# Patient Record
Sex: FEMALE | Race: ASIAN | NOT HISPANIC OR LATINO | URBAN - METROPOLITAN AREA
[De-identification: names, ages, dates, MRNs, and addresses within clinical notes are randomized per-mention and may not be internally consistent; named-entity substitution may affect disease eponyms.]

---

## 2018-12-31 ENCOUNTER — INPATIENT (INPATIENT)
Facility: HOSPITAL | Age: 68
LOS: 6 days | Discharge: HOME | DRG: 494 | End: 2019-01-07
Attending: ORTHOPAEDIC SURGERY | Admitting: ORTHOPAEDIC SURGERY
Payer: MEDICARE

## 2018-12-31 VITALS
TEMPERATURE: 98 F | DIASTOLIC BLOOD PRESSURE: 89 MMHG | RESPIRATION RATE: 16 BRPM | SYSTOLIC BLOOD PRESSURE: 173 MMHG | HEIGHT: 62 IN | OXYGEN SATURATION: 98 % | WEIGHT: 134.92 LBS | HEART RATE: 16 BPM

## 2018-12-31 DIAGNOSIS — S82.841A DISPLACED BIMALLEOLAR FRACTURE OF RIGHT LOWER LEG, INITIAL ENCOUNTER FOR CLOSED FRACTURE: ICD-10-CM

## 2018-12-31 LAB
ALBUMIN SERPL ELPH-MCNC: 3.7 G/DL — SIGNIFICANT CHANGE UP (ref 3.5–5)
ALP SERPL-CCNC: 70 U/L — SIGNIFICANT CHANGE UP (ref 40–120)
ALT FLD-CCNC: 45 U/L DA — SIGNIFICANT CHANGE UP (ref 10–60)
ANION GAP SERPL CALC-SCNC: 9 MMOL/L — SIGNIFICANT CHANGE UP (ref 5–17)
AST SERPL-CCNC: 15 U/L — SIGNIFICANT CHANGE UP (ref 10–40)
BASOPHILS # BLD AUTO: 0.1 K/UL — SIGNIFICANT CHANGE UP (ref 0–0.2)
BASOPHILS NFR BLD AUTO: 0.9 % — SIGNIFICANT CHANGE UP (ref 0–2)
BILIRUB SERPL-MCNC: 0.4 MG/DL — SIGNIFICANT CHANGE UP (ref 0.2–1.2)
BUN SERPL-MCNC: 15 MG/DL — SIGNIFICANT CHANGE UP (ref 7–18)
CALCIUM SERPL-MCNC: 8.7 MG/DL — SIGNIFICANT CHANGE UP (ref 8.4–10.5)
CHLORIDE SERPL-SCNC: 105 MMOL/L — SIGNIFICANT CHANGE UP (ref 96–108)
CO2 SERPL-SCNC: 28 MMOL/L — SIGNIFICANT CHANGE UP (ref 22–31)
CREAT SERPL-MCNC: 0.75 MG/DL — SIGNIFICANT CHANGE UP (ref 0.5–1.3)
EOSINOPHIL # BLD AUTO: 0.2 K/UL — SIGNIFICANT CHANGE UP (ref 0–0.5)
EOSINOPHIL NFR BLD AUTO: 1.8 % — SIGNIFICANT CHANGE UP (ref 0–6)
GLUCOSE SERPL-MCNC: 247 MG/DL — HIGH (ref 70–99)
HCT VFR BLD CALC: 39.4 % — SIGNIFICANT CHANGE UP (ref 34.5–45)
HGB BLD-MCNC: 12.5 G/DL — SIGNIFICANT CHANGE UP (ref 11.5–15.5)
INR BLD: 1.05 RATIO — SIGNIFICANT CHANGE UP (ref 0.88–1.16)
LYMPHOCYTES # BLD AUTO: 1.5 K/UL — SIGNIFICANT CHANGE UP (ref 1–3.3)
LYMPHOCYTES # BLD AUTO: 14.5 % — SIGNIFICANT CHANGE UP (ref 13–44)
MCHC RBC-ENTMCNC: 27.8 PG — SIGNIFICANT CHANGE UP (ref 27–34)
MCHC RBC-ENTMCNC: 31.7 GM/DL — LOW (ref 32–36)
MCV RBC AUTO: 87.5 FL — SIGNIFICANT CHANGE UP (ref 80–100)
MONOCYTES # BLD AUTO: 0.7 K/UL — SIGNIFICANT CHANGE UP (ref 0–0.9)
MONOCYTES NFR BLD AUTO: 6.9 % — SIGNIFICANT CHANGE UP (ref 2–14)
NEUTROPHILS # BLD AUTO: 8 K/UL — HIGH (ref 1.8–7.4)
NEUTROPHILS NFR BLD AUTO: 76 % — SIGNIFICANT CHANGE UP (ref 43–77)
PLATELET # BLD AUTO: 201 K/UL — SIGNIFICANT CHANGE UP (ref 150–400)
POTASSIUM SERPL-MCNC: 3.7 MMOL/L — SIGNIFICANT CHANGE UP (ref 3.5–5.3)
POTASSIUM SERPL-SCNC: 3.7 MMOL/L — SIGNIFICANT CHANGE UP (ref 3.5–5.3)
PROT SERPL-MCNC: 7.2 G/DL — SIGNIFICANT CHANGE UP (ref 6–8.3)
PROTHROM AB SERPL-ACNC: 11.7 SEC — SIGNIFICANT CHANGE UP (ref 10–12.9)
RBC # BLD: 4.51 M/UL — SIGNIFICANT CHANGE UP (ref 3.8–5.2)
RBC # FLD: 13.5 % — SIGNIFICANT CHANGE UP (ref 10.3–14.5)
SODIUM SERPL-SCNC: 142 MMOL/L — SIGNIFICANT CHANGE UP (ref 135–145)
WBC # BLD: 10.6 K/UL — HIGH (ref 3.8–10.5)
WBC # FLD AUTO: 10.6 K/UL — HIGH (ref 3.8–10.5)

## 2018-12-31 PROCEDURE — 71045 X-RAY EXAM CHEST 1 VIEW: CPT | Mod: 26

## 2018-12-31 PROCEDURE — 99285 EMERGENCY DEPT VISIT HI MDM: CPT

## 2018-12-31 PROCEDURE — 73610 X-RAY EXAM OF ANKLE: CPT | Mod: 26,50

## 2018-12-31 PROCEDURE — 71045 X-RAY EXAM CHEST 1 VIEW: CPT | Mod: 26,77

## 2018-12-31 PROCEDURE — 73600 X-RAY EXAM OF ANKLE: CPT | Mod: 26,50,59

## 2018-12-31 PROCEDURE — 73630 X-RAY EXAM OF FOOT: CPT | Mod: 26,50

## 2018-12-31 RX ORDER — OXYCODONE AND ACETAMINOPHEN 5; 325 MG/1; MG/1
1 TABLET ORAL EVERY 4 HOURS
Qty: 0 | Refills: 0 | Status: DISCONTINUED | OUTPATIENT
Start: 2018-12-31 | End: 2019-01-01

## 2018-12-31 RX ORDER — SODIUM CHLORIDE 9 MG/ML
1000 INJECTION, SOLUTION INTRAVENOUS
Qty: 0 | Refills: 0 | Status: DISCONTINUED | OUTPATIENT
Start: 2018-12-31 | End: 2019-01-01

## 2018-12-31 RX ORDER — CEFAZOLIN SODIUM 1 G
2000 VIAL (EA) INJECTION ONCE
Qty: 0 | Refills: 0 | Status: COMPLETED | OUTPATIENT
Start: 2018-12-31 | End: 2018-12-31

## 2018-12-31 RX ORDER — AMLODIPINE BESYLATE 2.5 MG/1
5 TABLET ORAL DAILY
Qty: 0 | Refills: 0 | Status: DISCONTINUED | OUTPATIENT
Start: 2018-12-31 | End: 2019-01-01

## 2018-12-31 RX ORDER — SODIUM CHLORIDE 9 MG/ML
3 INJECTION INTRAMUSCULAR; INTRAVENOUS; SUBCUTANEOUS ONCE
Qty: 0 | Refills: 0 | Status: COMPLETED | OUTPATIENT
Start: 2018-12-31 | End: 2018-12-31

## 2018-12-31 RX ORDER — HEPARIN SODIUM 5000 [USP'U]/ML
5000 INJECTION INTRAVENOUS; SUBCUTANEOUS EVERY 8 HOURS
Qty: 0 | Refills: 0 | Status: DISCONTINUED | OUTPATIENT
Start: 2018-12-31 | End: 2019-01-01

## 2018-12-31 RX ORDER — OXYCODONE AND ACETAMINOPHEN 5; 325 MG/1; MG/1
2 TABLET ORAL EVERY 6 HOURS
Qty: 0 | Refills: 0 | Status: DISCONTINUED | OUTPATIENT
Start: 2018-12-31 | End: 2019-01-01

## 2018-12-31 RX ADMIN — SODIUM CHLORIDE 3 MILLILITER(S): 9 INJECTION INTRAMUSCULAR; INTRAVENOUS; SUBCUTANEOUS at 11:18

## 2018-12-31 RX ADMIN — HEPARIN SODIUM 5000 UNIT(S): 5000 INJECTION INTRAVENOUS; SUBCUTANEOUS at 21:24

## 2018-12-31 RX ADMIN — Medication 100 MILLIGRAM(S): at 11:51

## 2018-12-31 NOTE — ED ADULT NURSE NOTE - NSIMPLEMENTINTERV_GEN_ALL_ED
Implemented All Universal Safety Interventions:  [place] to call system. Call bell, personal items and telephone within reach. Instruct patient to call for assistance. Room bathroom lighting operational. Non-slip footwear when patient is off stretcher. Physically safe environment: no spills, clutter or unnecessary equipment. Stretcher in lowest position, wheels locked, appropriate side rails in place.

## 2018-12-31 NOTE — CONSULT NOTE ADULT - SUBJECTIVE AND OBJECTIVE BOX
JAIMIE RODRÍGUEZ  MR# [id number]  68yFemale        Patient is a 68y old  Female who presents with a chief complaint of bilateral ankle fractures (31 Dec 2018 18:09)      INTERVAL HPI/OVERNIGHT EVENTS:  Patient seen and examined at bedside. No notations of chest pain, palpitation, SOB, orthopnea, nausea, vomiting or abdominal pain.  	  69 Y/O F with PMHx of HTN presents to the ED w/ bimalleolar fx of left ankle and trimalleolar fx of the right ankle s/p fall today. Pt. states she was walking down the stairs and missed one step. The pt tried stopping herself by leaning forward with her toes but ended up land face forward. Pt. rates her pain 7/10 and mentions the right ankle pain is worse compared to the left.  Pt denies head trauma, recent falls prior, LOC, Chest pain, SOB, dyspnea, paresthesias, N/V/D, abdominal pain, syncope, or pain anywhere else.      Review of Systems:  Other Review of Systems: All other review of systems negative, except as noted in HPI	      Allergies and Intolerances:        Allergies:  	No Known Allergies:     Home Medications:   * Patient Currently Takes Medications as of 21-Nov-2015 22:26 documented in Structured Notes  · 	amLODIPine 5 mg oral tablet: 1 tab(s) orally once a day    .    Patient History:    Past Medical History:  Hypertension.     Past Surgical History:  No significant past surgical history.     Tobacco Screening:  · Core Measure Site	Yes	  · Has the patient used tobacco in the past 30 days?	No	    Risk Assessment:    Present on Admission:  Deep Venous Thrombosis	no	  Pulmonary Embolus	no	     Heart Failure:  Does this patient have a history of or has been diagnosed with heart failure? no.    Hepatitis C Screen (per [facility] Department of Health, hepatitis C screening must be offered to every individual born between 1945 and 1965)	Offered and patient declined	        MEDICATIONS  amLODIPine   Tablet 5 milliGRAM(s) Oral daily  dextrose 5% + sodium chloride 0.9%. 1000 milliLiter(s) IV Continuous <Continuous>  heparin  Injectable 5000 Unit(s) SubCutaneous every 8 hours  oxyCODONE    5 mG/acetaminophen 325 mG 1 Tablet(s) Oral every 4 hours PRN Mild Pain (1 - 3)  oxyCODONE    5 mG/acetaminophen 325 mG 2 Tablet(s) Oral every 6 hours PRN Moderate Pain (4 - 6)              REVIEW OF SYSTEMS:  CONSTITUTIONAL: No fever, weight loss, or fatigue  EYES: No eye pain, visual disturbances, or discharge  ENT:  No difficulty hearing, tinnitus, vertigo; No sinus or throat pain  NECK: No pain or stiffness  RESPIRATORY: No cough, wheezing, chills or hemoptysis; No Shortness of Breath  CARDIOVASCULAR: No chest pain, palpitations, passing out, dizziness, or leg swelling  GASTROINTESTINAL: No abdominal or epigastric pain. No nausea, vomiting, or hematemesis; No diarrhea or constipation. No melena or hematochezia.  GENITOURINARY: No dysuria, frequency, hematuria, or incontinence  NEUROLOGICAL: No headaches, memory loss, loss of strength, numbness, or tremors  SKIN: No itching, burning, rashes, or lesions   LYMPH Nodes: No enlarged glands  ENDOCRINE: No heat or cold intolerance; No hair loss  MUSCULOSKELETAL: No joint pain or swelling; No muscle, back, or extremity pain  PSYCHIATRIC: No depression, anxiety, mood swings, or difficulty sleeping  HEME/LYMPH: No easy bruising, or bleeding gums  ALLERGY AND IMMUNOLOGIC: No hives or eczema	    [ ] All others negative	  [ ] Unable to obtain      T(C): 37.2 (12-31-18 @ 18:25), Max: 37.2 (12-31-18 @ 18:25)  T(F): 99 (12-31-18 @ 18:25), Max: 99 (12-31-18 @ 18:25)  HR: 89 (12-31-18 @ 18:25) (16 - 89)  BP: 119/65 (12-31-18 @ 18:25) (119/65 - 173/89)  RR: 17 (12-31-18 @ 18:25) (16 - 17)  SpO2: 98% (12-31-18 @ 18:25) (96% - 98%)  Wt(kg): --  Height (cm): 157.48 (12-31 @ 09:49)  Weight (kg): 61.2 (12-31 @ 09:49)  BMI (kg/m2): 24.7 (12-31 @ 09:49)  BSA (m2): 1.62 (12-31 @ 09:49)  I&O's Summary        PHYSICAL EXAM:  A X O x  HEAD:  Atraumatic, Normocephalic  EYES: EOMI, PERRLA, conjunctiva and sclera clear  NECK: Supple, No JVD, Normal thyroid  Resp: CTAB, No crackles, wheezing,   CVS: Regular rate and rhythm; No discernable murmurs, rubs, or gallops  ABD: Soft, Nontender, Nondistended; Bowel sounds present  EXTREMITIES:  2+ Peripheral Pulses, No edema  LYMPH: No dicernable lymphadenopathy noted  GENERAL: NAD, well-groomed, well-developed      LABS:                        12.5   10.6  )-----------( 201      ( 31 Dec 2018 11:35 )             39.4     12-31    142  |  105  |  15  ----------------------------<  247<H>  3.7   |  28  |  0.75    Ca    8.7      31 Dec 2018 11:35    TPro  7.2  /  Alb  3.7  /  TBili  0.4  /  DBili  x   /  AST  15  /  ALT  45  /  AlkPhos  70  12-31        CAPILLARY BLOOD GLUCOSE          Troponins:  ProBNP:  Lipid Profile:   HgA1c:  TSH:           RADIOLOGY & ADDITIONAL TESTS:    Imaging Personally Reviewed:  [ ] YES  [ ] NO      Consultant(s) Notes Reviewed:  [x ] YES  [ ] NO    Care Discussed with Consultants/Other Providers [ x] YES  [ ] NO          PAST MEDICAL & SURGICAL HISTORY:  Hypertension  No significant past surgical history        Closed displaced bimalleolar fracture of right ankle  Handoff  MEWS Score  Hypertension  Asthma  Ankle fracture, bimalleolar, closed, right, initial encounter  No significant past surgical history  W/BILATERAL ANKLE PAIN  90+  Ankle fracture, left

## 2018-12-31 NOTE — ED PROVIDER NOTE - MUSCULOSKELETAL MINIMAL EXAM
swelling and deformity to the right ankle, bilateral distal malleolar tenderness, left medial malleolar tenderness

## 2018-12-31 NOTE — CONSULT NOTE ADULT - ASSESSMENT
69 Y/O F with PMHx of HTN presents to the ED w/ bimalleolar fx of left ankle and trimalleolar fx of the right ankle s/p fall today. Pt. states she was walking down the stairs and missed one step. The pt tried stopping herself by leaning forward with her toes but ended up land face forward. Pt. rates her pain 7/10 and mentions the right ankle pain is worse compared to the left.  Pt denies head trauma, recent falls prior, LOC, Chest pain, SOB, dyspnea, paresthesias, N/V/D, abdominal pain, syncope, or pain anywhere else.     Given comprehensive review of H&P, labs & EKG and CXR pending WNL,  in addition to denial of any risk factors in existence to suggest premature CAD or valvulopathy (reporting no exertional sob or angina, or family h/o CAD) pt has been cleared at moderate risk for OR in a.m.    -f/u Vit D, phos, mg, Ca+ total & PTH (r/o premature osteoporosis)  -continue baseline meds  -f/u EKG and CXR  -GI /DVT PP  -Cleared at moderate risk for OR

## 2018-12-31 NOTE — ED PROVIDER NOTE - OBJECTIVE STATEMENT
69 y/o female with PMHx of HTN presents to the ED with c/o bilateral ankle pain s/p falling down one step. Pt suffered a laceration to her left ankle and was unable to ambulate afterwards. Pt noted deformity on right ankle which she helped reduced on her own. Pt reports pain with movement. Pt denies numbness, head trauma, or Hx of CAD.

## 2018-12-31 NOTE — H&P ADULT - ASSESSMENT
67 y/o F with Right trimalleolar fx and left bimalleolar equivocal fracture   - Pain Management  - DVT ppx with Heparin  - Admit to Ortho Dr. Varela  - Condition: Stable  - Transfer to Floor  - Pre-op for Surgery tomorrow on 01/01/19  - Procedure: ORIF of b/l ankle fractures  - Surgeon: Dr. Varela  - Clearance: Pending - Dr. Patton made aware  - Consent: to be obtained by Dr. Varela  - Medical Clearance called  - Keep NPO past midnight tonight  - IVF when NPO  - Hold Anticoagulants tomorrow on 01/01/19  - Keep affected extremities elevated on 2 pillows  - Patient was placed in b/l Posterior - U splint   - Case Discussed with DR. Varela 69 y/o F with Right trimalleolar fx and left bimalleolar equivocal fracture   - Pain Management  - DVT ppx with Heparin  - Admit to Ortho Dr. Varela  - Condition: Stable  - Transfer to Floor  - Pre-op for Surgery tomorrow on 01/01/19  - Procedure: ORIF of b/l ankle fractures  - Surgeon: Dr. Varela  - Clearance: Pending - Dr. Patton made aware  - Consent: to be obtained by Dr. Varela  - Medical Clearance called  - Keep NPO past midnight tonight  - IVF when NPO  - Hold Anticoagulants tomorrow on 01/01/19  - Keep affected extremities elevated on 2 pillows  - Patient was placed in b/l Posterior - U splint   - Left ankle wound was copiously irrigated with betadine and NaCl. Xeroform placed over wound.  - Case Discussed with DR. Varela

## 2018-12-31 NOTE — ED PROVIDER NOTE - CARE PLAN
Principal Discharge DX:	Ankle fracture, bimalleolar, closed, right, initial encounter  Secondary Diagnosis:	Ankle fracture, left

## 2018-12-31 NOTE — H&P ADULT - NSHPLABSRESULTS_GEN_ALL_CORE
12.5   10.6  )-----------( 201      ( 31 Dec 2018 11:35 )             39.4   12-31    142  |  105  |  15  ----------------------------<  247<H>  3.7   |  28  |  0.75    Ca    8.7      31 Dec 2018 11:35    TPro  7.2  /  Alb  3.7  /  TBili  0.4  /  DBili  x   /  AST  15  /  ALT  45  /  AlkPhos  70  12-31    < from: Xray Ankle Complete 3 Views, Bilateral (12.31.18 @ 13:00) >      EXAM:  FOOT BILATERAL (MINIMUM 3 V)                          EXAM:  ANKLE BILATERAL (MINIMUM 3 V)                        INTERPRETATION:  History: Fall. Bilateral ankle pain.    Findings: Frontal, lateral and oblique projections bilateral ankle and   frontal, lateral and oblique projections bilateral feet performed.   Right ankle:   Displaced trimalleolar fracture and dislocation right ankle.     Soft tissue swelling.   Plantar calcaneal spur.  Left ankle:    Nondisplaced bimalleolar fracture left ankle. Left ankle mortise is   aligned. There is also nondisplaced fracture proximal left fifth   metatarsal.     Diffuse soft tissue swelling. Few dots of air seen within the   subcutaneous soft tissues.    Small plantar calcaneal spur.    Impression:  Displaced trimalleolar fracture and dislocation right ankle.  Nondisplaced bimalleolar fracture left ankle.    Nondisplaced fracture proximal left fifth metatarsal.

## 2018-12-31 NOTE — H&P ADULT - NSHPPHYSICALEXAM_GEN_ALL_CORE
General: AAOx3. Nad  HEENT: PERRLA, EOMI  Neck: No JVD, neck supple, Trachea midline  CVS: S1/S1 present, RRR, no MRG  Pulm: CTA b/l. No WRR  Abd: +BS, soft, ND NT  Ext: 2+ pulses. No edema. good cap refill SILT  Neuro: no focal neurodeficits. CNII-XII intact  MSK:   LLE: Skin warm and pink. Swelling noted to medial/lateral aspect of ankle. Small punctate wound noted over anterior aspect of ankle. Left ankle AROM limited 2/2 pain. Full AROM of all toes. Cap refill <2 seconds. Compartments soft and compressible. Calves soft and NTTP. SILT. NVI  RLE: Skin warm and pink. Diffuse swelling noted over medial/lateral aspect of ankle. Ecchymosis noted over ankle. Tenderness to palpation over medial/lateral malleolus. No open wound. NVI. SILT. compartments soft and compressible. Calves soft and NTTP.

## 2018-12-31 NOTE — H&P ADULT - ATTENDING COMMENTS
pt abel and evaluated.  bilateral ankle fractures.  rec cr l ankle fx bimall, treatw iht closed reduction / fracture treatment / care.  cr persormed on r andkl rec orif.  small punctate wound on r ankle rec exploration poss I and d cont antibiotics.  nwb / dvt prophylaxis.  will need mecial clearance

## 2018-12-31 NOTE — H&P ADULT - HISTORY OF PRESENT ILLNESS
67 Y/O F with PMHx of HTN presents to the ED w/ bimalleolar fx of left ankle and trimalleolar fx of the right ankle s/p fall today. Pt. states she was walking down the stairs and missed one step. The pt. tried stopping herself by leaning forward with her toes but ended up land face forward. Pt. rates her pain 7/10 and mentions the right ankle pain is worse compared to the left.  Pt denies head trauma, recent falls prior, LOC, Chest pain, SOB, dyspnea, paresthesias, N/V/D, abdominal pain, syncope, or pain anywhere else. 67 Y/O F with PMHx of HTN presents to the ED w/ bilateral ankle fractures s/p fall today. Pt. states she was walking down the stairs and missed one step. The pt. tried stopping herself by leaning forward with her toes but ended up landing face forward. Pt. rates her pain "7/10", non-radiating and mentions the right ankle pain is worse compared to the left.  Pt denies head trauma, recent falls prior, LOC, Chest pain, SOB, dyspnea, paresthesias, N/V/D, abdominal pain, syncope, or pain anywhere else.

## 2019-01-01 PROCEDURE — 77071 MNL APPL STRS JT RADIOGRAPHY: CPT

## 2019-01-01 PROCEDURE — 20900 REMOVAL OF BONE FOR GRAFT: CPT | Mod: AS,RT,59

## 2019-01-01 PROCEDURE — 27658 REPAIR OF LEG TENDON EACH: CPT | Mod: AS,59,RT

## 2019-01-01 PROCEDURE — 27814 TREATMENT OF ANKLE FRACTURE: CPT | Mod: AS,RT

## 2019-01-01 PROCEDURE — 27656 REPAIR LEG FASCIA DEFECT: CPT | Mod: AS,59,RT

## 2019-01-01 RX ORDER — HYDROMORPHONE HYDROCHLORIDE 2 MG/ML
0.5 INJECTION INTRAMUSCULAR; INTRAVENOUS; SUBCUTANEOUS
Qty: 0 | Refills: 0 | Status: DISCONTINUED | OUTPATIENT
Start: 2019-01-01 | End: 2019-01-01

## 2019-01-01 RX ORDER — ENOXAPARIN SODIUM 100 MG/ML
30 INJECTION SUBCUTANEOUS EVERY 12 HOURS
Qty: 0 | Refills: 0 | Status: DISCONTINUED | OUTPATIENT
Start: 2019-01-01 | End: 2019-01-07

## 2019-01-01 RX ORDER — SODIUM CHLORIDE 9 MG/ML
1000 INJECTION, SOLUTION INTRAVENOUS
Qty: 0 | Refills: 0 | Status: DISCONTINUED | OUTPATIENT
Start: 2019-01-01 | End: 2019-01-01

## 2019-01-01 RX ORDER — AMLODIPINE BESYLATE 2.5 MG/1
5 TABLET ORAL DAILY
Qty: 0 | Refills: 0 | Status: DISCONTINUED | OUTPATIENT
Start: 2019-01-01 | End: 2019-01-07

## 2019-01-01 RX ORDER — SODIUM CHLORIDE 9 MG/ML
1000 INJECTION, SOLUTION INTRAVENOUS
Qty: 0 | Refills: 0 | Status: DISCONTINUED | OUTPATIENT
Start: 2019-01-02 | End: 2019-01-07

## 2019-01-01 RX ORDER — OXYCODONE HYDROCHLORIDE 5 MG/1
5 TABLET ORAL EVERY 4 HOURS
Qty: 0 | Refills: 0 | Status: DISCONTINUED | OUTPATIENT
Start: 2019-01-01 | End: 2019-01-07

## 2019-01-01 RX ORDER — FENTANYL CITRATE 50 UG/ML
25 INJECTION INTRAVENOUS
Qty: 0 | Refills: 0 | Status: DISCONTINUED | OUTPATIENT
Start: 2019-01-01 | End: 2019-01-01

## 2019-01-01 RX ORDER — ACETAMINOPHEN 500 MG
650 TABLET ORAL EVERY 6 HOURS
Qty: 0 | Refills: 0 | Status: DISCONTINUED | OUTPATIENT
Start: 2019-01-01 | End: 2019-01-07

## 2019-01-01 RX ORDER — OXYCODONE HYDROCHLORIDE 5 MG/1
10 TABLET ORAL EVERY 4 HOURS
Qty: 0 | Refills: 0 | Status: DISCONTINUED | OUTPATIENT
Start: 2019-01-01 | End: 2019-01-07

## 2019-01-01 RX ORDER — MORPHINE SULFATE 50 MG/1
2 CAPSULE, EXTENDED RELEASE ORAL EVERY 4 HOURS
Qty: 0 | Refills: 0 | Status: DISCONTINUED | OUTPATIENT
Start: 2019-01-02 | End: 2019-01-07

## 2019-01-01 RX ORDER — CEFAZOLIN SODIUM 1 G
1000 VIAL (EA) INJECTION EVERY 8 HOURS
Qty: 0 | Refills: 0 | Status: DISCONTINUED | OUTPATIENT
Start: 2019-01-01 | End: 2019-01-04

## 2019-01-01 RX ORDER — ONDANSETRON 8 MG/1
4 TABLET, FILM COATED ORAL ONCE
Qty: 0 | Refills: 0 | Status: DISCONTINUED | OUTPATIENT
Start: 2019-01-01 | End: 2019-01-01

## 2019-01-01 RX ADMIN — Medication 100 MILLIGRAM(S): at 22:24

## 2019-01-01 RX ADMIN — SODIUM CHLORIDE 75 MILLILITER(S): 9 INJECTION, SOLUTION INTRAVENOUS at 11:44

## 2019-01-01 RX ADMIN — SODIUM CHLORIDE 125 MILLILITER(S): 9 INJECTION, SOLUTION INTRAVENOUS at 18:54

## 2019-01-01 RX ADMIN — AMLODIPINE BESYLATE 5 MILLIGRAM(S): 2.5 TABLET ORAL at 06:32

## 2019-01-01 RX ADMIN — OXYCODONE HYDROCHLORIDE 10 MILLIGRAM(S): 5 TABLET ORAL at 22:25

## 2019-01-01 RX ADMIN — OXYCODONE HYDROCHLORIDE 10 MILLIGRAM(S): 5 TABLET ORAL at 23:55

## 2019-01-01 NOTE — PROGRESS NOTE ADULT - SUBJECTIVE AND OBJECTIVE BOX
JAIMIE RODRÍGUEZ  MR# [id number]  68yFemale        Patient is a 68y old  Female who presents with a chief complaint of bilateral ankle fractures (31 Dec 2018 19:35)      INTERVAL HPI/OVERNIGHT EVENTS:  Patient seen and examined at bedside. No notations of chest pain, palpitation, SOB, orthopnea, nausea, vomiting or abdominal pain.    ALLERGIES  No Known Allergies      MEDICATIONS  amLODIPine   Tablet 5 milliGRAM(s) Oral daily  dextrose 5% + sodium chloride 0.9%. 1000 milliLiter(s) IV Continuous <Continuous>  heparin  Injectable 5000 Unit(s) SubCutaneous every 8 hours  oxyCODONE    5 mG/acetaminophen 325 mG 1 Tablet(s) Oral every 4 hours PRN Mild Pain (1 - 3)  oxyCODONE    5 mG/acetaminophen 325 mG 2 Tablet(s) Oral every 6 hours PRN Moderate Pain (4 - 6)              REVIEW OF SYSTEMS:  CONSTITUTIONAL: No fever, weight loss, or fatigue  EYES: No eye pain, visual disturbances, or discharge  ENT:  No difficulty hearing, tinnitus, vertigo; No sinus or throat pain  NECK: No pain or stiffness  RESPIRATORY: No cough, wheezing, chills or hemoptysis; No Shortness of Breath  CARDIOVASCULAR: No chest pain, palpitations, passing out, dizziness, or leg swelling  GASTROINTESTINAL: No abdominal or epigastric pain. No nausea, vomiting, or hematemesis; No diarrhea or constipation. No melena or hematochezia.  GENITOURINARY: No dysuria, frequency, hematuria, or incontinence  NEUROLOGICAL: No headaches, memory loss, loss of strength, numbness, or tremors  SKIN: No itching, burning, rashes, or lesions   LYMPH Nodes: No enlarged glands  ENDOCRINE: No heat or cold intolerance; No hair loss  MUSCULOSKELETAL: No joint pain or swelling; No muscle, back, or extremity pain  PSYCHIATRIC: No depression, anxiety, mood swings, or difficulty sleeping  HEME/LYMPH: No easy bruising, or bleeding gums  ALLERGY AND IMMUNOLOGIC: No hives or eczema	    [ ] All others negative	  [ ] Unable to obtain      T(C): 37.3 (01-01-19 @ 05:55), Max: 37.6 (12-31-18 @ 22:03)  T(F): 99.2 (01-01-19 @ 05:55), Max: 99.7 (12-31-18 @ 22:03)  HR: 94 (01-01-19 @ 05:55) (16 - 94)  BP: 136/67 (01-01-19 @ 05:55) (119/65 - 173/89)  RR: 16 (01-01-19 @ 05:55) (16 - 17)  SpO2: 96% (01-01-19 @ 05:55) (95% - 98%)  Wt(kg): --  Height (cm): 157.48 (12-31 @ 09:49)  Weight (kg): 61.2 (12-31 @ 09:49)  BMI (kg/m2): 24.7 (12-31 @ 09:49)  BSA (m2): 1.62 (12-31 @ 09:49)  I&O's Summary        PHYSICAL EXAM:  A X O x  HEAD:  Atraumatic, Normocephalic  EYES: EOMI, PERRLA, conjunctiva and sclera clear  NECK: Supple, No JVD, Normal thyroid  Resp: CTAB, No crackles, wheezing,   CVS: Regular rate and rhythm; No discernable murmurs, rubs, or gallops  ABD: Soft, Nontender, Nondistended; Bowel sounds present  EXTREMITIES:  2+ Peripheral Pulses, No edema  LYMPH: No dicernable lymphadenopathy noted  GENERAL: NAD, well-groomed, well-developed      LABS:                        12.5   10.6  )-----------( 201      ( 31 Dec 2018 11:35 )             39.4     12-31    142  |  105  |  15  ----------------------------<  247<H>  3.7   |  28  |  0.75    Ca    8.7      31 Dec 2018 11:35    TPro  7.2  /  Alb  3.7  /  TBili  0.4  /  DBili  x   /  AST  15  /  ALT  45  /  AlkPhos  70  12-31    PT/INR - ( 31 Dec 2018 19:34 )   PT: 11.7 sec;   INR: 1.05 ratio             CAPILLARY BLOOD GLUCOSE          Troponins:  ProBNP:  Lipid Profile:   HgA1c:  TSH:           RADIOLOGY & ADDITIONAL TESTS:    Imaging Personally Reviewed:  [ ] YES  [ ] NO      Consultant(s) Notes Reviewed:  [x ] YES  [ ] NO    Care Discussed with Consultants/Other Providers [ x] YES  [ ] NO          PAST MEDICAL & SURGICAL HISTORY:  Hypertension  No significant past surgical history        Closed displaced bimalleolar fracture of right ankle  Handoff  MEWS Score  Hypertension  Asthma  Ankle fracture, bimalleolar, closed, right, initial encounter  No significant past surgical history  W/BILATERAL ANKLE PAIN  90+  Ankle fracture, left

## 2019-01-01 NOTE — PROGRESS NOTE ADULT - ATTENDING COMMENTS
pt seen and evaluated.  rec ORIF r ankle trimalleolar fracture.  eua ankle wound and fracture stability.  explained risks benefits alternatives to pt

## 2019-01-01 NOTE — PROGRESS NOTE ADULT - SUBJECTIVE AND OBJECTIVE BOX
JAIMIE RODRÍGUEZ  [id number]  68y    Orthopedic Pre-Op Note    Dx: B/L ankle fractures.     Pt tolerated procedure well.  No acute events. no acute complaints.     Denies cp/sob/dyspnea,paresthesias.    T(C): 37.3 (01-01-19 @ 07:59), Max: 37.6 (12-31-18 @ 22:03)  HR: 94 (01-01-19 @ 07:59) (16 - 94)  BP: 136/64 (01-01-19 @ 07:59) (119/65 - 149/83)  RR: 16 (01-01-19 @ 05:55) (16 - 17)  SpO2: 96% (01-01-19 @ 07:59) (95% - 98%)    PE:   Lt ankle: small 1mm punctate wound over anterior ankle that drains blood with pressure.  tenderness over medial and lateral malleoli. 2+pulses. nvi silt. no ct calf soft b/l. AO Splint intact.   Rt ankle: no open wounds. skin pink and intact. warm. no fracture blisters noted. AO splint intact.   tenderness over malleoli. 2+ pulses. nvi silt. no ct calves    Impression:   Plan:  - Pain control  - Condition: Stable  - medically cleared  - dvt ppx  - for surgery today  - procedure: ORIF rt ankle fx, I&D lt ankle possibly  - surgeon: dr kinney  - rey d/w dr kinney

## 2019-01-01 NOTE — PROGRESS NOTE ADULT - ASSESSMENT
69 Y/O F with PMHx of HTN presents to the ED w/ bimalleolar fx of left ankle and trimalleolar fx of the right ankle s/p fall today. Pt. states she was walking down the stairs and missed one step. The pt tried stopping herself by leaning forward with her toes but ended up land face forward. Pt. rates her pain 7/10 and mentions the right ankle pain is worse compared to the left.  Pt denies head trauma, recent falls prior, LOC, Chest pain, SOB, dyspnea, paresthesias, N/V/D, abdominal pain, syncope, or pain anywhere else.     Given comprehensive review of H&P, labs & EKG and CXR pending WNL,  in addition to denial of any risk factors in existence to suggest premature CAD or valvulopathy (reporting no exertional sob or angina, or family h/o CAD) pt has been cleared at moderate risk for OR in a.m.    -f/u Vit D, phos, mg, Ca+ total & PTH (r/o premature osteoporosis)  -continue baseline meds  -EKG and CXR - WNL (EKG done by bedside, no discernable anomaly   -GI /DVT PP  -Cleared at moderate risk for OR / General Anesthesia

## 2019-01-02 RX ADMIN — AMLODIPINE BESYLATE 5 MILLIGRAM(S): 2.5 TABLET ORAL at 05:32

## 2019-01-02 RX ADMIN — ENOXAPARIN SODIUM 30 MILLIGRAM(S): 100 INJECTION SUBCUTANEOUS at 17:17

## 2019-01-02 RX ADMIN — OXYCODONE HYDROCHLORIDE 10 MILLIGRAM(S): 5 TABLET ORAL at 05:29

## 2019-01-02 RX ADMIN — Medication 100 MILLIGRAM(S): at 05:32

## 2019-01-02 RX ADMIN — Medication 100 MILLIGRAM(S): at 13:38

## 2019-01-02 RX ADMIN — OXYCODONE HYDROCHLORIDE 10 MILLIGRAM(S): 5 TABLET ORAL at 06:00

## 2019-01-02 RX ADMIN — ENOXAPARIN SODIUM 30 MILLIGRAM(S): 100 INJECTION SUBCUTANEOUS at 05:32

## 2019-01-02 RX ADMIN — Medication 100 MILLIGRAM(S): at 22:14

## 2019-01-02 NOTE — PROGRESS NOTE ADULT - ASSESSMENT
69 Y/O F with PMHx of HTN presents to the ED w/ bimalleolar fx of left ankle and trimalleolar fx of the right ankle s/p fall today. Pt. states she was walking down the stairs and missed one step. The pt tried stopping herself by leaning forward with her toes but ended up land face forward. Pt. rates her pain 7/10 and mentions the right ankle pain is worse compared to the left.  Pt denies head trauma, recent falls prior, LOC, Chest pain, SOB, dyspnea, paresthesias, N/V/D, abdominal pain, syncope, or pain anywhere else.     Given comprehensive review of H&P, labs & EKG and CXR pending WNL,  in addition to denial of any risk factors in existence to suggest premature CAD or valvulopathy (reporting no exertional sob or angina, or family h/o CAD) pt was cleared at moderate risk for OR - S/P left bi-malleolar fx and S/p ORIF Right Ankle Fx POD#1    -f/u Vit D, phos, mg, Ca+ total & PTH (r/o premature osteoporosis)  -continue baseline meds  -Incentive stephanie, oob to chair w/ PT/OT to follow  -Consider NSAIDs use in pain management to decrease reliance on opoid based analgesic given GI / hemodynamic side-effcts  -GI /DVT PPX

## 2019-01-02 NOTE — PROGRESS NOTE ADULT - SUBJECTIVE AND OBJECTIVE BOX
JAIMIE RODRÍGUEZ  MR# [id number]  68yFemale        Patient is a 68y old  Female who presents with a chief complaint of bilateral ankle fractures (02 Jan 2019 07:43)      INTERVAL HPI/OVERNIGHT EVENTS:  Patient seen and examined at bedside. S/P left bi-malleolar fx and S/p ORIF Right Ankle Fx POD#1. No notations of chest pain, palpitation, SOB, orthopnea, nausea, vomiting or abdominal pain.    ALLERGIES  No Known Allergies      MEDICATIONS  acetaminophen   Tablet .. 650 milliGRAM(s) Oral every 6 hours PRN Temp greater or equal to 38C (100.4F)  amLODIPine   Tablet 5 milliGRAM(s) Oral daily  ceFAZolin   IVPB 1000 milliGRAM(s) IV Intermittent every 8 hours  dextrose 5% + sodium chloride 0.9%. 1000 milliLiter(s) IV Continuous <Continuous>  enoxaparin Injectable 30 milliGRAM(s) SubCutaneous every 12 hours  morphine  - Injectable 2 milliGRAM(s) IV Push every 4 hours PRN Moderate Pain (4 - 6)  oxyCODONE    IR 10 milliGRAM(s) Oral every 4 hours PRN Moderate Pain (4 - 6)  oxyCODONE    IR 5 milliGRAM(s) Oral every 4 hours PRN Mild Pain (1 - 3)              REVIEW OF SYSTEMS:  CONSTITUTIONAL: No fever, weight loss, or fatigue  EYES: No eye pain, visual disturbances, or discharge  ENT:  No difficulty hearing, tinnitus, vertigo; No sinus or throat pain  NECK: No pain or stiffness  RESPIRATORY: No cough, wheezing, chills or hemoptysis; No Shortness of Breath  CARDIOVASCULAR: No chest pain, palpitations, passing out, dizziness, or leg swelling  GASTROINTESTINAL: No abdominal or epigastric pain. No nausea, vomiting, or hematemesis; No diarrhea or constipation. No melena or hematochezia.  GENITOURINARY: No dysuria, frequency, hematuria, or incontinence  NEUROLOGICAL: No headaches, memory loss, loss of strength, numbness, or tremors  SKIN: No itching, burning, rashes, or lesions   LYMPH Nodes: No enlarged glands  ENDOCRINE: No heat or cold intolerance; No hair loss  MUSCULOSKELETAL: No joint pain or swelling; No muscle, back, or extremity pain  PSYCHIATRIC: No depression, anxiety, mood swings, or difficulty sleeping  HEME/LYMPH: No easy bruising, or bleeding gums  ALLERGY AND IMMUNOLOGIC: No hives or eczema	    [ ] All others negative	  [ ] Unable to obtain      T(C): 37.2 (01-02-19 @ 14:53), Max: 37.2 (01-02-19 @ 14:53)  T(F): 98.9 (01-02-19 @ 14:53), Max: 98.9 (01-02-19 @ 14:53)  HR: 81 (01-02-19 @ 14:53) (69 - 85)  BP: 131/58 (01-02-19 @ 14:53) (120/48 - 131/58)  RR: 16 (01-02-19 @ 14:53) (16 - 18)  SpO2: 96% (01-02-19 @ 14:53) (95% - 96%)  Wt(kg): --    I&O's Summary    01 Jan 2019 07:01  -  02 Jan 2019 07:00  --------------------------------------------------------  IN: 1500 mL / OUT: 300 mL / NET: 1200 mL          PHYSICAL EXAM:  A X O x  HEAD:  Atraumatic, Normocephalic  EYES: EOMI, PERRLA, conjunctiva and sclera clear  NECK: Supple, No JVD, Normal thyroid  Resp: CTAB, No crackles, wheezing,   CVS: Regular rate and rhythm; No discernable murmurs, rubs, or gallops  ABD: Soft, Nontender, Nondistended; Bowel sounds present  EXTREMITIES:  2+ Peripheral Pulses, No edema  LYMPH: No dicernable lymphadenopathy noted  GENERAL: NAD, well-groomed, well-developed      LABS:              CAPILLARY BLOOD GLUCOSE          Troponins:  ProBNP:  Lipid Profile:   HgA1c:  TSH:           RADIOLOGY & ADDITIONAL TESTS:    Imaging Personally Reviewed:  [ ] YES  [ ] NO      Consultant(s) Notes Reviewed:  [x ] YES  [ ] NO    Care Discussed with Consultants/Other Providers [ x] YES  [ ] NO          PAST MEDICAL & SURGICAL HISTORY:  Hypertension  No significant past surgical history        Closed displaced bimalleolar fracture of right ankle  Handoff  MEWS Score  Hypertension  Asthma  Ankle fracture, bimalleolar, closed, right, initial encounter  No significant past surgical history  W/BILATERAL ANKLE PAIN  90+  Ankle fracture, left

## 2019-01-02 NOTE — PHYSICAL THERAPY INITIAL EVALUATION ADULT - GENERAL OBSERVATIONS, REHAB EVAL
pt aox3 abby cooperative, s/p fall B ankle fx, s/p R ankle ORIF, seated bedside with assistance LUIS F BRIONES

## 2019-01-02 NOTE — PHYSICAL THERAPY INITIAL EVALUATION ADULT - PLANNED THERAPY INTERVENTIONS, PT EVAL
with updated weight bearing status/gait training/transfer training/bed mobility training/strengthening

## 2019-01-03 ENCOUNTER — TRANSCRIPTION ENCOUNTER (OUTPATIENT)
Age: 69
End: 2019-01-03

## 2019-01-03 RX ORDER — OXYCODONE HYDROCHLORIDE 5 MG/1
1 TABLET ORAL
Qty: 0 | Refills: 0 | COMMUNITY
Start: 2019-01-03

## 2019-01-03 RX ORDER — OXYCODONE HYDROCHLORIDE 5 MG/1
1 TABLET ORAL
Qty: 0 | Refills: 0 | DISCHARGE
Start: 2019-01-03

## 2019-01-03 RX ADMIN — Medication 100 MILLIGRAM(S): at 05:25

## 2019-01-03 RX ADMIN — OXYCODONE HYDROCHLORIDE 10 MILLIGRAM(S): 5 TABLET ORAL at 00:00

## 2019-01-03 RX ADMIN — AMLODIPINE BESYLATE 5 MILLIGRAM(S): 2.5 TABLET ORAL at 05:25

## 2019-01-03 RX ADMIN — Medication 100 MILLIGRAM(S): at 13:52

## 2019-01-03 RX ADMIN — ENOXAPARIN SODIUM 30 MILLIGRAM(S): 100 INJECTION SUBCUTANEOUS at 05:26

## 2019-01-03 RX ADMIN — ENOXAPARIN SODIUM 30 MILLIGRAM(S): 100 INJECTION SUBCUTANEOUS at 18:21

## 2019-01-03 RX ADMIN — Medication 100 MILLIGRAM(S): at 22:42

## 2019-01-03 RX ADMIN — OXYCODONE HYDROCHLORIDE 10 MILLIGRAM(S): 5 TABLET ORAL at 05:26

## 2019-01-03 NOTE — DISCHARGE NOTE ADULT - ADDITIONAL INSTRUCTIONS
Pain Management- See Attached Medication Reconciliation  Weight Bearing Status: NWB to b/l LE  Equipment needs: Wheelchair  Dressing: Please keep splint Clean, Dry, and Intact.  Incision Site: REMOVE sutures on right ankle on 01/16/19  Sutures TO BE REMOVED BY NURSE  NURSE TO APPLY STERI-STRIPS TO THE WOUND AFTER SUTURE REMOVAL and REAPPLY THE SAME SPLINT TO THE RLE  DVT prophylaxis: Continue for 6 weeks. D/C LOVENOX when patient is ambulating independently  PT/Occupational Therapy are Activities of Daily Living as appropriate  Follow up with Orthopedic Surgeon in 1-2 weeks after Sutures ARE REMOVED at: [phone]

## 2019-01-03 NOTE — DISCHARGE NOTE ADULT - HOSPITAL COURSE
67 y/o F admitted for bilateral ankle fractures. Patient underwent an ORIF of right ankle and irrigation of left ankle wound.

## 2019-01-03 NOTE — PROGRESS NOTE ADULT - SUBJECTIVE AND OBJECTIVE BOX
JAIMIE RODRÍGUEZ  MRN-[id number]    Orthopedics:    Diagnosis:  Left ankle bi-malleolar fx and S/p ORIF Right Ankle Fx POD# 2    Interpretation services used: ID#[id number]. Pt seen and evaluated. No acute complaints.   As per pt, Splint feels comfortable.  Denies CP/SOB, dyspnea, paresthesias.    Vital Signs Last 24 Hrs  T(C): 37.3 (03 Jan 2019 06:31), Max: 37.3 (03 Jan 2019 06:31)  T(F): 99.2 (03 Jan 2019 06:31), Max: 99.2 (03 Jan 2019 06:31)  HR: 88 (03 Jan 2019 06:31) (69 - 88)  BP: 138/68 (03 Jan 2019 06:31) (121/51 - 144/68)  BP(mean): --  RR: 18 (03 Jan 2019 06:31) (16 - 18)  SpO2: 97% (03 Jan 2019 06:31) (96% - 98%)    Physical Exam:  Ankles- Toes are pink, warm. SILT. No pain with PROM of toes. Good cap refill.  AO  Splint intact. Calves are soft and NT.       Impression: 68yFemale Left ankle bi-malleolar fx and S/p ORIF Right Ankle Fx POD# 2  -  Pain management  -  DVT prophylaxis with [ X ] Lovenox SQ   -  Daily PT- NWB of the b/l ankles  -  C/w post-op abx  -  D/c planning to rehab: awaiting acceptance.  -  Case d/w Dr. Varela

## 2019-01-03 NOTE — DISCHARGE NOTE ADULT - CARE PLAN
Principal Discharge DX:	Ankle fracture, right, closed, initial encounter  Goal:	Improve ROM and improve pain  Assessment and plan of treatment:	- S/p ORIF of right ankle.  - Patient is to be NWB to b/l LE   - Keep splint clean and dry  - Continue with Lovenox 40mg qd for 6 weeks. Discontinue when patient is ambulating independently  - Sutures are to be removed on 01/16/19  Secondary Diagnosis:	Ankle fracture, left  Goal:	Improve ROM and improve pain  Assessment and plan of treatment:	- Patient is to be NWB to left ankle  - Keep splint clean and dry  Secondary Diagnosis:	Hypertension  Goal:	C/w care as directed by primary care doctor Principal Discharge DX:	Ankle fracture, right, closed, initial encounter  Goal:	Improve ROM and improve pain  Assessment and plan of treatment:	- S/p ORIF of right ankle.  - Patient is to be NWB to b/l LE   - Keep splint clean and dry  - Continue with Lovenox 40mg qd for 6 weeks. Discontinue when patient is ambulating independently  - Sutures are to be removed on 01/16/19 - Reapply splint after suture removal  Secondary Diagnosis:	Ankle fracture, left  Goal:	Improve ROM and improve pain  Assessment and plan of treatment:	- Patient is to be NWB to left ankle  - Keep splint clean and dry  Secondary Diagnosis:	Hypertension  Goal:	C/w care as directed by primary care doctor

## 2019-01-03 NOTE — DISCHARGE NOTE ADULT - PATIENT PORTAL LINK FT
You can access the [facility] Patient Portal, offered by [facility], by registering with the following website: [email or web address]

## 2019-01-03 NOTE — DISCHARGE NOTE ADULT - PLAN OF CARE
Improve ROM and improve pain - S/p ORIF of right ankle.  - Patient is to be NWB to b/l LE   - Keep splint clean and dry  - Continue with Lovenox 40mg qd for 6 weeks. Discontinue when patient is ambulating independently  - Sutures are to be removed on 01/16/19 - Patient is to be NWB to left ankle  - Keep splint clean and dry C/w care as directed by primary care doctor - S/p ORIF of right ankle.  - Patient is to be NWB to b/l LE   - Keep splint clean and dry  - Continue with Lovenox 40mg qd for 6 weeks. Discontinue when patient is ambulating independently  - Sutures are to be removed on 01/16/19 - Reapply splint after suture removal

## 2019-01-03 NOTE — DISCHARGE NOTE ADULT - MEDICATION SUMMARY - MEDICATIONS TO TAKE
I will START or STAY ON the medications listed below when I get home from the hospital:    oxyCODONE 10 mg oral tablet  -- 1 tab(s) by mouth every 4 hours, As needed, Moderate Pain (4 - 6)  -- Indication: For Moderate pain    oxyCODONE 5 mg oral tablet  -- 1 tab(s) by mouth every 4 hours, As needed, Mild Pain (1 - 3)  -- Indication: For Mild pain    losartan 50 mg oral tablet  -- 1 tab(s) by mouth once a day  -- Indication: For As prescribed by primary care doctor    Lovenox 40 mg/0.4 mL injectable solution  -- 40 milligram(s) subcutaneous once a day  -- Indication: For DVT prophylaxis    meclizine 25 mg oral tablet  -- 1 tab(s) by mouth every 6 hours, As Needed- for dizziness   -- May cause drowsiness.  Alcohol may intensify this effect.  Use care when operating dangerous machinery.      -- Indication: For As prescribed by primary care doctor    amLODIPine 5 mg oral tablet  -- 1 tab(s) by mouth once a day  -- Indication: For As prescribed by primary care doctor    Keflex 500 mg oral capsule  -- 1 tab(s) by mouth 4 times a day  -- Indication: For Antibiotic I will START or STAY ON the medications listed below when I get home from the hospital:    oxyCODONE 5 mg oral tablet  -- 1 tab(s) by mouth every 4 hours, As needed, Mild Pain (1 - 3)  -- Indication: For Mild pain    losartan 50 mg oral tablet  -- 1 tab(s) by mouth once a day  -- Indication: For As prescribed by primary care doctor    Lovenox 40 mg/0.4 mL injectable solution  -- 40 milligram(s) subcutaneous once a day  -- Indication: For DVT prophylaxis    meclizine 25 mg oral tablet  -- 1 tab(s) by mouth every 6 hours, As Needed- for dizziness   -- May cause drowsiness.  Alcohol may intensify this effect.  Use care when operating dangerous machinery.      -- Indication: For As prescribed by primary care doctor    amLODIPine 5 mg oral tablet  -- 1 tab(s) by mouth once a day  -- Indication: For As prescribed by primary care doctor    Keflex 500 mg oral capsule  -- 1 tab(s) by mouth 4 times a day  -- Indication: For Antibiotics

## 2019-01-03 NOTE — PROGRESS NOTE ADULT - ASSESSMENT
67 Y/O F with PMHx of HTN presents to the ED w/ bimalleolar fx of left ankle and trimalleolar fx of the right ankle s/p fall today. Pt. states she was walking down the stairs and missed one step. The pt tried stopping herself by leaning forward with her toes but ended up land face forward. Pt. rates her pain 7/10 and mentions the right ankle pain is worse compared to the left.  Pt denies head trauma, recent falls prior, LOC, Chest pain, SOB, dyspnea, paresthesias, N/V/D, abdominal pain, syncope, or pain anywhere else.     Given comprehensive review of H&P, labs & EKG and CXR pending WNL,  in addition to denial of any risk factors in existence to suggest premature CAD or valvulopathy (reporting no exertional sob or angina, or family h/o CAD) pt was cleared at moderate risk for OR - S/P left bi-malleolar fx and S/p ORIF Right Ankle Fx POD#1    -f/u as outpt w/ pt's PMD as d/w pt Vit D, phos, mg, Ca+ total & PTH (r/o premature osteoporosis) or bone scan  -continue baseline meds  -Incentive stephanie, oob to chair w/ PT/OT to follow  -Consider NSAIDs use in pain management to decrease reliance on opoid based analgesic given GI / hemodynamic side-effcts  -GI /DVT PPX  -medically stable for d/c

## 2019-01-03 NOTE — DISCHARGE NOTE ADULT - CARE PROVIDER_API CALL
Santos Varela (MD), Orthopaedic Surgery; Sports Medicine   [facility]  [place] [address]  Phone: [phone]  Fax: [phone]

## 2019-01-04 RX ADMIN — ENOXAPARIN SODIUM 30 MILLIGRAM(S): 100 INJECTION SUBCUTANEOUS at 05:34

## 2019-01-04 RX ADMIN — AMLODIPINE BESYLATE 5 MILLIGRAM(S): 2.5 TABLET ORAL at 05:34

## 2019-01-04 RX ADMIN — ENOXAPARIN SODIUM 30 MILLIGRAM(S): 100 INJECTION SUBCUTANEOUS at 17:35

## 2019-01-04 RX ADMIN — Medication 100 MILLIGRAM(S): at 05:33

## 2019-01-04 NOTE — PROGRESS NOTE ADULT - SUBJECTIVE AND OBJECTIVE BOX
JAIMIE RODRÍGUEZ  MR# [id number]  68yFemale        Patient is a 68y old  Female who presents with a chief complaint of bilateral ankle fractures (04 Jan 2019 08:15)      INTERVAL HPI/OVERNIGHT EVENTS:  Patient seen and examined at bedside. No notations of chest pain, palpitation, SOB, orthopnea, nausea, vomiting or abdominal pain.    ALLERGIES  No Known Allergies      MEDICATIONS  acetaminophen   Tablet .. 650 milliGRAM(s) Oral every 6 hours PRN Temp greater or equal to 38C (100.4F)  amLODIPine   Tablet 5 milliGRAM(s) Oral daily  dextrose 5% + sodium chloride 0.9%. 1000 milliLiter(s) IV Continuous <Continuous>  enoxaparin Injectable 30 milliGRAM(s) SubCutaneous every 12 hours  morphine  - Injectable 2 milliGRAM(s) IV Push every 4 hours PRN Moderate Pain (4 - 6)  oxyCODONE    IR 10 milliGRAM(s) Oral every 4 hours PRN Moderate Pain (4 - 6)  oxyCODONE    IR 5 milliGRAM(s) Oral every 4 hours PRN Mild Pain (1 - 3)              REVIEW OF SYSTEMS:  CONSTITUTIONAL: No fever, weight loss, or fatigue  EYES: No eye pain, visual disturbances, or discharge  ENT:  No difficulty hearing, tinnitus, vertigo; No sinus or throat pain  NECK: No pain or stiffness  RESPIRATORY: No cough, wheezing, chills or hemoptysis; No Shortness of Breath  CARDIOVASCULAR: No chest pain, palpitations, passing out, dizziness, or leg swelling  GASTROINTESTINAL: No abdominal or epigastric pain. No nausea, vomiting, or hematemesis; No diarrhea or constipation. No melena or hematochezia.  GENITOURINARY: No dysuria, frequency, hematuria, or incontinence  NEUROLOGICAL: No headaches, memory loss, loss of strength, numbness, or tremors  SKIN: No itching, burning, rashes, or lesions   LYMPH Nodes: No enlarged glands  ENDOCRINE: No heat or cold intolerance; No hair loss  MUSCULOSKELETAL: No joint pain or swelling; No muscle, back, or extremity pain  PSYCHIATRIC: No depression, anxiety, mood swings, or difficulty sleeping  HEME/LYMPH: No easy bruising, or bleeding gums  ALLERGY AND IMMUNOLOGIC: No hives or eczema	    [ ] All others negative	  [ ] Unable to obtain      T(C): 36.7 (01-04-19 @ 13:15), Max: 37.2 (01-03-19 @ 22:10)  T(F): 98.1 (01-04-19 @ 13:15), Max: 99 (01-03-19 @ 22:10)  HR: 94 (01-04-19 @ 13:15) (86 - 94)  BP: 120/65 (01-04-19 @ 13:15) (120/65 - 157/73)  RR: 16 (01-04-19 @ 13:15) (16 - 16)  SpO2: 97% (01-04-19 @ 13:15) (97% - 99%)  Wt(kg): --    I&O's Summary        PHYSICAL EXAM:  A X O x  HEAD:  Atraumatic, Normocephalic  EYES: EOMI, PERRLA, conjunctiva and sclera clear  NECK: Supple, No JVD, Normal thyroid  Resp: CTAB, No crackles, wheezing,   CVS: Regular rate and rhythm; No discernable murmurs, rubs, or gallops  ABD: Soft, Nontender, Nondistended; Bowel sounds present  EXTREMITIES:  2+ Peripheral Pulses, No edema  LYMPH: No dicernable lymphadenopathy noted  GENERAL: NAD, well-groomed, well-developed      LABS:              CAPILLARY BLOOD GLUCOSE          Troponins:  ProBNP:  Lipid Profile:   HgA1c:  TSH:           RADIOLOGY & ADDITIONAL TESTS:    Imaging Personally Reviewed:  [ ] YES  [ ] NO      Consultant(s) Notes Reviewed:  [x ] YES  [ ] NO    Care Discussed with Consultants/Other Providers [ x] YES  [ ] NO          PAST MEDICAL & SURGICAL HISTORY:  Hypertension  No significant past surgical history        Closed displaced bimalleolar fracture of right ankle  Handoff  MEWS Score  Hypertension  Asthma  ORIF fracture of right ankle  Ankle fracture, right, closed, initial encounter  Ankle fracture, bimalleolar, closed, right, initial encounter  No significant past surgical history  W/BILATERAL ANKLE PAIN  90+  Hypertension  Ankle fracture, left Daily Assessment

## 2019-01-04 NOTE — PROGRESS NOTE ADULT - SUBJECTIVE AND OBJECTIVE BOX
JAIMIE RODRÍGUEZ  MRN-[id number]    Orthopedics:    Diagnosis:  Left ankle bi-malleolar fx and S/p ORIF Right Ankle Fx POD# 3    Interpretation services used: ID#. Pt seen and evaluated. No acute complaints.   As per pt, Splint feels comfortable.  Denies CP/SOB, dyspnea, paresthesias.    Vital Signs Last 24 Hrs  T(C): 36.9 (04 Jan 2019 05:24), Max: 37.4 (03 Jan 2019 14:36)  T(F): 98.5 (04 Jan 2019 05:24), Max: 99.3 (03 Jan 2019 14:36)  HR: 86 (04 Jan 2019 05:24) (77 - 90)  BP: 135/74 (04 Jan 2019 05:24) (121/66 - 157/73)  BP(mean): --  RR: 16 (04 Jan 2019 05:24) (16 - 17)  SpO2: 98% (04 Jan 2019 05:24) (95% - 99%)    Physical Exam:  Ankles- Toes are pink, warm. SILT. No pain with PROM of toes. Good cap refill.  AO  Splint intact. Calves are soft and NT.       Impression: 68yFemale Left ankle bi-malleolar fx and S/p ORIF Right Ankle Fx POD# 3  -  Pain management  -  DVT prophylaxis with [ X ] Lovenox SQ   -  Daily PT- NWB of the b/l ankles  -  D/c planning to rehab: pending auth  -  Case d/w Dr. Varela

## 2019-01-05 RX ADMIN — ENOXAPARIN SODIUM 30 MILLIGRAM(S): 100 INJECTION SUBCUTANEOUS at 05:30

## 2019-01-05 RX ADMIN — AMLODIPINE BESYLATE 5 MILLIGRAM(S): 2.5 TABLET ORAL at 05:30

## 2019-01-05 RX ADMIN — ENOXAPARIN SODIUM 30 MILLIGRAM(S): 100 INJECTION SUBCUTANEOUS at 17:30

## 2019-01-05 NOTE — PROGRESS NOTE ADULT - SUBJECTIVE AND OBJECTIVE BOX
JAIMIE RODRÍGUEZ  [id number]    Orthopedics:    Diagnosis:  S/p ORIF R Ankle Fx POD#3 and Irrigation of Left ankle wound    Pt seen and evaluated. No acute complaints. [place]  [employer]and[employer] #[id number] used.  As per pt, Splint feels comfortable.  Denies CP/SOB, dyspnea, paresthesias.    Vital Signs Last 24 Hrs  T(C): 36.7 (05 Jan 2019 05:29), Max: 37.3 (04 Jan 2019 21:50)  T(F): 98.1 (05 Jan 2019 05:29), Max: 99.2 (04 Jan 2019 21:50)  HR: 80 (05 Jan 2019 05:29) (80 - 94)  BP: 129/72 (05 Jan 2019 05:29) (117/66 - 129/72)  BP(mean): --  RR: 16 (05 Jan 2019 05:29) (16 - 16)  SpO2: 98% (05 Jan 2019 05:29) (97% - 98%)    Physical Exam:  R Ankle- Toes are pink, warm. with SILT. No pain with PROM of toes. Good cap refill.  AO  Splint intact. Calves are soft and NT.   L Ankle- toes are pink with <2 seconds capillary refill. NVI. ROM of toes. Spint intact.     Labs:       Impression: 68yFemale S/p ORIF R Ankle Fx POD#3 and S/p Irrigation of left ankle wound  -  Pain management  -  DVT prophylaxis with Lovenox  -  Daily PT- NWB of the R and L Ankle  -  D/c  rehab planning pending authorization

## 2019-01-05 NOTE — PROGRESS NOTE ADULT - SUBJECTIVE AND OBJECTIVE BOX
JAIMIE RODRÍGUEZ  MR# [id number]  68yFemale        Patient is a 68y old  Female who presents with a chief complaint of bilateral ankle fractures (05 Jan 2019 11:58)      INTERVAL HPI/OVERNIGHT EVENTS:  Patient seen and examined at bedside. S/p ORIF R Ankle Fx POD#3 and S/p Irrigation of left ankle wound. No notations of chest pain, palpitation, SOB, orthopnea, nausea, vomiting or abdominal pain.    ALLERGIES  No Known Allergies      MEDICATIONS  acetaminophen   Tablet .. 650 milliGRAM(s) Oral every 6 hours PRN Temp greater or equal to 38C (100.4F)  amLODIPine   Tablet 5 milliGRAM(s) Oral daily  dextrose 5% + sodium chloride 0.9%. 1000 milliLiter(s) IV Continuous <Continuous>  enoxaparin Injectable 30 milliGRAM(s) SubCutaneous every 12 hours  morphine  - Injectable 2 milliGRAM(s) IV Push every 4 hours PRN Moderate Pain (4 - 6)  oxyCODONE    IR 10 milliGRAM(s) Oral every 4 hours PRN Moderate Pain (4 - 6)  oxyCODONE    IR 5 milliGRAM(s) Oral every 4 hours PRN Mild Pain (1 - 3)              REVIEW OF SYSTEMS:  CONSTITUTIONAL: No fever, weight loss, or fatigue  EYES: No eye pain, visual disturbances, or discharge  ENT:  No difficulty hearing, tinnitus, vertigo; No sinus or throat pain  NECK: No pain or stiffness  RESPIRATORY: No cough, wheezing, chills or hemoptysis; No Shortness of Breath  CARDIOVASCULAR: No chest pain, palpitations, passing out, dizziness, or leg swelling  GASTROINTESTINAL: No abdominal or epigastric pain. No nausea, vomiting, or hematemesis; No diarrhea or constipation. No melena or hematochezia.  GENITOURINARY: No dysuria, frequency, hematuria, or incontinence  NEUROLOGICAL: No headaches, memory loss, loss of strength, numbness, or tremors  SKIN: No itching, burning, rashes, or lesions   LYMPH Nodes: No enlarged glands  ENDOCRINE: No heat or cold intolerance; No hair loss  MUSCULOSKELETAL: No joint pain or swelling; No muscle, back, or extremity pain  PSYCHIATRIC: No depression, anxiety, mood swings, or difficulty sleeping  HEME/LYMPH: No easy bruising, or bleeding gums  ALLERGY AND IMMUNOLOGIC: No hives or eczema	    [ ] All others negative	  [ ] Unable to obtain      T(C): 37.1 (01-05-19 @ 14:00), Max: 37.3 (01-04-19 @ 21:50)  T(F): 98.8 (01-05-19 @ 14:00), Max: 99.2 (01-04-19 @ 21:50)  HR: 80 (01-05-19 @ 14:44) (80 - 86)  BP: 133/64 (01-05-19 @ 14:44) (117/66 - 135/60)  RR: 17 (01-05-19 @ 14:00) (16 - 17)  SpO2: 98% (01-05-19 @ 14:44) (98% - 99%)  Wt(kg): --    I&O's Summary        PHYSICAL EXAM:  A X O x  HEAD:  Atraumatic, Normocephalic  EYES: EOMI, PERRLA, conjunctiva and sclera clear  NECK: Supple, No JVD, Normal thyroid  Resp: CTAB, No crackles, wheezing,   CVS: Regular rate and rhythm; No discernable murmurs, rubs, or gallops  ABD: Soft, Nontender, Nondistended; Bowel sounds present  EXTREMITIES:  2+ Peripheral Pulses, No edema  LYMPH: No dicernable lymphadenopathy noted  GENERAL: NAD, well-groomed, well-developed      LABS:              CAPILLARY BLOOD GLUCOSE          Troponins:  ProBNP:  Lipid Profile:   HgA1c:  TSH:           RADIOLOGY & ADDITIONAL TESTS:    Imaging Personally Reviewed:  [ ] YES  [ ] NO      Consultant(s) Notes Reviewed:  [x ] YES  [ ] NO    Care Discussed with Consultants/Other Providers [ x] YES  [ ] NO          PAST MEDICAL & SURGICAL HISTORY:  Hypertension  No significant past surgical history        Closed displaced bimalleolar fracture of right ankle  Handoff  MEWS Score  Hypertension  Asthma  ORIF fracture of right ankle  Ankle fracture, right, closed, initial encounter  Ankle fracture, bimalleolar, closed, right, initial encounter  No significant past surgical history  W/BILATERAL ANKLE PAIN  90+  Hypertension  Ankle fracture, left

## 2019-01-05 NOTE — PROGRESS NOTE ADULT - ASSESSMENT
67 Y/O F with PMHx of HTN presents to the ED w/ bimalleolar fx of left ankle and trimalleolar fx of the right ankle s/p fall today. Pt. states she was walking down the stairs and missed one step. The pt tried stopping herself by leaning forward with her toes but ended up land face forward. Pt. rates her pain 7/10 and mentions the right ankle pain is worse compared to the left.  Pt denies head trauma, recent falls prior, LOC, Chest pain, SOB, dyspnea, paresthesias, N/V/D, abdominal pain, syncope, or pain anywhere else.     Given comprehensive review of H&P, labs & EKG and CXR pending WNL,  in addition to denial of any risk factors in existence to suggest premature CAD or valvulopathy (reporting no exertional sob or angina, or family h/o CAD) pt was cleared at moderate risk for OR - S/P left bi-malleolar fx and S/p ORIF R Ankle Fx POD#3 and S/p Irrigation of left ankle wound    -f/u wound cx -   -maintain Vit C + Zinc to promote wound healing  -f/u as outpt w/ pt's PMD as d/w pt Vit D, phos, mg, Ca+ total & PTH (r/o premature osteoporosis) or bone scan  -continue baseline meds  -Incentive stephanie, oob to chair w/ PT/OT to follow  -Consider NSAIDs use in pain management to decrease reliance on opoid based analgesic given GI / hemodynamic side-effcts  -GI /DVT PPX  -medically stable for d/c

## 2019-01-06 RX ADMIN — ENOXAPARIN SODIUM 30 MILLIGRAM(S): 100 INJECTION SUBCUTANEOUS at 06:38

## 2019-01-06 RX ADMIN — ENOXAPARIN SODIUM 30 MILLIGRAM(S): 100 INJECTION SUBCUTANEOUS at 18:01

## 2019-01-06 RX ADMIN — AMLODIPINE BESYLATE 5 MILLIGRAM(S): 2.5 TABLET ORAL at 06:38

## 2019-01-06 NOTE — PROGRESS NOTE ADULT - SUBJECTIVE AND OBJECTIVE BOX
JAIMIE RODRÍGUEZ  MR# [id number]  68yFemale        Patient is a 68y old  Female who presents with a chief complaint of bilateral ankle fractures (06 Jan 2019 11:55)      INTERVAL HPI/OVERNIGHT EVENTS:  Patient seen and examined at bedside. No notations of chest pain, palpitation, SOB, orthopnea, nausea, vomiting or abdominal pain.    ALLERGIES  No Known Allergies      MEDICATIONS  acetaminophen   Tablet .. 650 milliGRAM(s) Oral every 6 hours PRN Temp greater or equal to 38C (100.4F)  amLODIPine   Tablet 5 milliGRAM(s) Oral daily  dextrose 5% + sodium chloride 0.9%. 1000 milliLiter(s) IV Continuous <Continuous>  enoxaparin Injectable 30 milliGRAM(s) SubCutaneous every 12 hours  morphine  - Injectable 2 milliGRAM(s) IV Push every 4 hours PRN Moderate Pain (4 - 6)  oxyCODONE    IR 10 milliGRAM(s) Oral every 4 hours PRN Moderate Pain (4 - 6)  oxyCODONE    IR 5 milliGRAM(s) Oral every 4 hours PRN Mild Pain (1 - 3)              REVIEW OF SYSTEMS:  CONSTITUTIONAL: No fever, weight loss, or fatigue  EYES: No eye pain, visual disturbances, or discharge  ENT:  No difficulty hearing, tinnitus, vertigo; No sinus or throat pain  NECK: No pain or stiffness  RESPIRATORY: No cough, wheezing, chills or hemoptysis; No Shortness of Breath  CARDIOVASCULAR: No chest pain, palpitations, passing out, dizziness, or leg swelling  GASTROINTESTINAL: No abdominal or epigastric pain. No nausea, vomiting, or hematemesis; No diarrhea or constipation. No melena or hematochezia.  GENITOURINARY: No dysuria, frequency, hematuria, or incontinence  NEUROLOGICAL: No headaches, memory loss, loss of strength, numbness, or tremors  SKIN: No itching, burning, rashes, or lesions   LYMPH Nodes: No enlarged glands  ENDOCRINE: No heat or cold intolerance; No hair loss  MUSCULOSKELETAL: No joint pain or swelling; No muscle, back, or extremity pain  PSYCHIATRIC: No depression, anxiety, mood swings, or difficulty sleeping  HEME/LYMPH: No easy bruising, or bleeding gums  ALLERGY AND IMMUNOLOGIC: No hives or eczema	    [ ] All others negative	  [ ] Unable to obtain      T(C): 37.2 (01-06-19 @ 14:52), Max: 37.2 (01-06-19 @ 14:52)  T(F): 99 (01-06-19 @ 14:52), Max: 99 (01-06-19 @ 14:52)  HR: 92 (01-06-19 @ 14:52) (79 - 92)  BP: 131/77 (01-06-19 @ 14:52) (106/74 - 136/69)  RR: 17 (01-06-19 @ 14:52) (16 - 17)  SpO2: 99% (01-06-19 @ 14:52) (99% - 100%)  Wt(kg): --    I&O's Summary        PHYSICAL EXAM:  A X O x  HEAD:  Atraumatic, Normocephalic  EYES: EOMI, PERRLA, conjunctiva and sclera clear  NECK: Supple, No JVD, Normal thyroid  Resp: CTAB, No crackles, wheezing,   CVS: Regular rate and rhythm; No discernable murmurs, rubs, or gallops  ABD: Soft, Nontender, Nondistended; Bowel sounds present  EXTREMITIES:  2+ Peripheral Pulses, No edema  LYMPH: No dicernable lymphadenopathy noted  GENERAL: NAD, well-groomed, well-developed      LABS:              CAPILLARY BLOOD GLUCOSE          Troponins:  ProBNP:  Lipid Profile:   HgA1c:  TSH:           RADIOLOGY & ADDITIONAL TESTS:    Imaging Personally Reviewed:  [ ] YES  [ ] NO      Consultant(s) Notes Reviewed:  [x ] YES  [ ] NO    Care Discussed with Consultants/Other Providers [ x] YES  [ ] NO          PAST MEDICAL & SURGICAL HISTORY:  Hypertension  No significant past surgical history        Closed displaced bimalleolar fracture of right ankle  Handoff  MEWS Score  Hypertension  Asthma  ORIF fracture of right ankle  Ankle fracture, right, closed, initial encounter  Ankle fracture, bimalleolar, closed, right, initial encounter  No significant past surgical history  W/BILATERAL ANKLE PAIN  90+  Hypertension  Ankle fracture, left

## 2019-01-06 NOTE — PROGRESS NOTE ADULT - SUBJECTIVE AND OBJECTIVE BOX
JAIMIE RODRÍGUEZ  [id number]    Orthopedics:    Diagnosis:  S/p ORIF R Ankle Fx POD#4 and Irrigation of left ankle wound    Pt seen and evaluated. No acute complaints. Saline  Mandarin #[id number] used.  As per pt, Splint feels comfortable.  Denies CP/SOB, dyspnea, paresthesias    Vital Signs Last 24 Hrs  T(C): 36.8 (06 Jan 2019 05:12), Max: 37.1 (05 Jan 2019 14:00)  T(F): 98.2 (06 Jan 2019 05:12), Max: 98.8 (05 Jan 2019 14:00)  HR: 79 (06 Jan 2019 09:53) (79 - 90)  BP: 106/74 (06 Jan 2019 09:53) (106/74 - 136/69)  BP(mean): --  RR: 16 (06 Jan 2019 05:12) (16 - 17)  SpO2: 99% (06 Jan 2019 05:12) (98% - 100%)    Physical Exam:  R Ankle- Toes are pink, warm. with SILT. No pain with PROM of toes. Good cap refill.  AO  Splint intact. Calves are soft and NT.   L Ankle- Toes are pink, warm. with SILT. No pain with PROM of toes. Good cap refill. Calves are soft and NT.     Labs:       Impression: 68yFemale S/p ORIF R Ankle Fx POD#4 and Irrigation of Left ankle wound  -  Pain management  -  DVT prophylaxis with Lovenox  -  Daily PT- NWB of B/L Ankles  -  D/c to rehab planning

## 2019-01-06 NOTE — PROGRESS NOTE ADULT - ASSESSMENT
67 Y/O F with PMHx of HTN presents to the ED w/ bimalleolar fx of left ankle and trimalleolar fx of the right ankle s/p fall today. Pt. states she was walking down the stairs and missed one step. The pt tried stopping herself by leaning forward with her toes but ended up land face forward. Pt. rates her pain 7/10 and mentions the right ankle pain is worse compared to the left.  Pt denies head trauma, recent falls prior, LOC, Chest pain, SOB, dyspnea, paresthesias, N/V/D, abdominal pain, syncope, or pain anywhere else.     Given comprehensive review of H&P, labs & EKG and CXR pending WNL,  in addition to denial of any risk factors in existence to suggest premature CAD or valvulopathy (reporting no exertional sob or angina, or family h/o CAD) pt was cleared at moderate risk for OR - S/P left bi-malleolar fx and S/p ORIF R Ankle Fx POD#4 and S/p Irrigation of left ankle wound    -f/u wound cx -   -maintain Vit C + Zinc to promote wound healing  -f/u as outpt w/ pt's PMD as d/w pt Vit D, phos, mg, Ca+ total & PTH (r/o premature osteoporosis) or bone scan  -continue baseline meds  -Incentive stephanie, oob to chair w/ PT/OT to follow  -Consider NSAIDs use in pain management to decrease reliance on opoid based analgesic given GI / hemodynamic side-effcts  -GI /DVT PPX  -medically stable for d/c

## 2019-01-07 VITALS
HEART RATE: 83 BPM | DIASTOLIC BLOOD PRESSURE: 53 MMHG | SYSTOLIC BLOOD PRESSURE: 126 MMHG | TEMPERATURE: 99 F | RESPIRATION RATE: 16 BRPM | OXYGEN SATURATION: 100 %

## 2019-01-07 PROCEDURE — 97530 THERAPEUTIC ACTIVITIES: CPT

## 2019-01-07 PROCEDURE — 76000 FLUOROSCOPY <1 HR PHYS/QHP: CPT

## 2019-01-07 PROCEDURE — 85610 PROTHROMBIN TIME: CPT

## 2019-01-07 PROCEDURE — 85027 COMPLETE CBC AUTOMATED: CPT

## 2019-01-07 PROCEDURE — 97161 PT EVAL LOW COMPLEX 20 MIN: CPT

## 2019-01-07 PROCEDURE — 73630 X-RAY EXAM OF FOOT: CPT

## 2019-01-07 PROCEDURE — C1713: CPT

## 2019-01-07 PROCEDURE — 80053 COMPREHEN METABOLIC PANEL: CPT

## 2019-01-07 PROCEDURE — 86901 BLOOD TYPING SEROLOGIC RH(D): CPT

## 2019-01-07 PROCEDURE — 73610 X-RAY EXAM OF ANKLE: CPT

## 2019-01-07 PROCEDURE — 99285 EMERGENCY DEPT VISIT HI MDM: CPT | Mod: 25

## 2019-01-07 PROCEDURE — 71045 X-RAY EXAM CHEST 1 VIEW: CPT

## 2019-01-07 PROCEDURE — 73600 X-RAY EXAM OF ANKLE: CPT

## 2019-01-07 PROCEDURE — 93005 ELECTROCARDIOGRAM TRACING: CPT

## 2019-01-07 PROCEDURE — 86900 BLOOD TYPING SEROLOGIC ABO: CPT

## 2019-01-07 PROCEDURE — 36415 COLL VENOUS BLD VENIPUNCTURE: CPT

## 2019-01-07 PROCEDURE — 86850 RBC ANTIBODY SCREEN: CPT

## 2019-01-07 RX ORDER — CEPHALEXIN 500 MG
1 CAPSULE ORAL
Qty: 0 | Refills: 0 | COMMUNITY

## 2019-01-07 RX ORDER — INFLUENZA VIRUS VACCINE 15; 15; 15; 15 UG/.5ML; UG/.5ML; UG/.5ML; UG/.5ML
0.5 SUSPENSION INTRAMUSCULAR ONCE
Qty: 0 | Refills: 0 | Status: DISCONTINUED | OUTPATIENT
Start: 2019-01-07 | End: 2019-01-07

## 2019-01-07 RX ADMIN — ENOXAPARIN SODIUM 30 MILLIGRAM(S): 100 INJECTION SUBCUTANEOUS at 05:24

## 2019-01-07 RX ADMIN — AMLODIPINE BESYLATE 5 MILLIGRAM(S): 2.5 TABLET ORAL at 05:24

## 2019-01-07 NOTE — PROGRESS NOTE ADULT - SUBJECTIVE AND OBJECTIVE BOX
JAIMIE RODRÍGUEZ  MR# [id number]  68yFemale        Patient is a 68y old  Female who presents with a chief complaint of bilateral ankle fractures (07 Jan 2019 08:01)      INTERVAL HPI/OVERNIGHT EVENTS:  Patient seen and examined at bedside. No notations of chest pain, palpitation, SOB, orthopnea, nausea, vomiting or abdominal pain.    ALLERGIES  No Known Allergies      MEDICATIONS  acetaminophen   Tablet .. 650 milliGRAM(s) Oral every 6 hours PRN Temp greater or equal to 38C (100.4F)  amLODIPine   Tablet 5 milliGRAM(s) Oral daily  dextrose 5% + sodium chloride 0.9%. 1000 milliLiter(s) IV Continuous <Continuous>  enoxaparin Injectable 30 milliGRAM(s) SubCutaneous every 12 hours  influenza   Vaccine 0.5 milliLiter(s) IntraMuscular once  morphine  - Injectable 2 milliGRAM(s) IV Push every 4 hours PRN Moderate Pain (4 - 6)  oxyCODONE    IR 10 milliGRAM(s) Oral every 4 hours PRN Moderate Pain (4 - 6)  oxyCODONE    IR 5 milliGRAM(s) Oral every 4 hours PRN Mild Pain (1 - 3)              REVIEW OF SYSTEMS:  CONSTITUTIONAL: No fever, weight loss, or fatigue  EYES: No eye pain, visual disturbances, or discharge  ENT:  No difficulty hearing, tinnitus, vertigo; No sinus or throat pain  NECK: No pain or stiffness  RESPIRATORY: No cough, wheezing, chills or hemoptysis; No Shortness of Breath  CARDIOVASCULAR: No chest pain, palpitations, passing out, dizziness, or leg swelling  GASTROINTESTINAL: No abdominal or epigastric pain. No nausea, vomiting, or hematemesis; No diarrhea or constipation. No melena or hematochezia.  GENITOURINARY: No dysuria, frequency, hematuria, or incontinence  NEUROLOGICAL: No headaches, memory loss, loss of strength, numbness, or tremors  SKIN: No itching, burning, rashes, or lesions   LYMPH Nodes: No enlarged glands  ENDOCRINE: No heat or cold intolerance; No hair loss  MUSCULOSKELETAL: No joint pain or swelling; No muscle, back, or extremity pain  PSYCHIATRIC: No depression, anxiety, mood swings, or difficulty sleeping  HEME/LYMPH: No easy bruising, or bleeding gums  ALLERGY AND IMMUNOLOGIC: No hives or eczema	    [ ] All others negative	  [ ] Unable to obtain      T(C): 37.1 (01-07-19 @ 14:36), Max: 37.1 (01-07-19 @ 14:36)  T(F): 98.8 (01-07-19 @ 14:36), Max: 98.8 (01-07-19 @ 14:36)  HR: 83 (01-07-19 @ 14:36) (66 - 87)  BP: 126/53 (01-07-19 @ 14:36) (109/60 - 128/59)  RR: 16 (01-07-19 @ 14:36) (16 - 16)  SpO2: 100% (01-07-19 @ 14:36) (97% - 100%)  Wt(kg): --    I&O's Summary        PHYSICAL EXAM:  A X O x  HEAD:  Atraumatic, Normocephalic  EYES: EOMI, PERRLA, conjunctiva and sclera clear  NECK: Supple, No JVD, Normal thyroid  Resp: CTAB, No crackles, wheezing,   CVS: Regular rate and rhythm; No discernable murmurs, rubs, or gallops  ABD: Soft, Nontender, Nondistended; Bowel sounds present  EXTREMITIES:  2+ Peripheral Pulses, No edema  LYMPH: No dicernable lymphadenopathy noted  GENERAL: NAD, well-groomed, well-developed      LABS:              CAPILLARY BLOOD GLUCOSE          Troponins:  ProBNP:  Lipid Profile:   HgA1c:  TSH:           RADIOLOGY & ADDITIONAL TESTS:    Imaging Personally Reviewed:  [ ] YES  [ ] NO      Consultant(s) Notes Reviewed:  [x ] YES  [ ] NO    Care Discussed with Consultants/Other Providers [ x] YES  [ ] NO          PAST MEDICAL & SURGICAL HISTORY:  Hypertension  No significant past surgical history        Closed displaced bimalleolar fracture of right ankle  Handoff  MEWS Score  Hypertension  Asthma  ORIF fracture of right ankle  Ankle fracture, right, closed, initial encounter  Ankle fracture, bimalleolar, closed, right, initial encounter  No significant past surgical history  W/BILATERAL ANKLE PAIN  90+  Hypertension  Ankle fracture, left

## 2019-01-07 NOTE — PROGRESS NOTE ADULT - ASSESSMENT
69 Y/O F with PMHx of HTN presents to the ED w/ bimalleolar fx of left ankle and trimalleolar fx of the right ankle s/p fall today. Pt. states she was walking down the stairs and missed one step. The pt tried stopping herself by leaning forward with her toes but ended up land face forward. Pt. rates her pain 7/10 and mentions the right ankle pain is worse compared to the left.  Pt denies head trauma, recent falls prior, LOC, Chest pain, SOB, dyspnea, paresthesias, N/V/D, abdominal pain, syncope, or pain anywhere else.     Given comprehensive review of H&P, labs & EKG and CXR pending WNL,  in addition to denial of any risk factors in existence to suggest premature CAD or valvulopathy (reporting no exertional sob or angina, or family h/o CAD) pt was cleared at moderate risk for OR - S/P left bi-malleolar fx and S/p ORIF R Ankle Fx POD#5 and S/p Irrigation of left ankle wound    -f/u localized wound care daily   -maintain Vit C + Zinc to promote wound healing  -f/u as outpt w/ pt's PMD as d/w pt Vit D, phos, mg, Ca+ total & PTH (r/o premature osteoporosis) or bone scan  -continue baseline meds  -Incentive stephanie, oob to chair w/ PT/OT to follow  -Consider NSAIDs use in pain management to decrease reliance on opoid based analgesic given GI / hemodynamic side-effcts  -GI /DVT PPX  -medically stable for d/c

## 2019-01-07 NOTE — PROGRESS NOTE ADULT - SUBJECTIVE AND OBJECTIVE BOX
JAIMIE RODRÍGUEZ  [id number]    Orthopedics:    Diagnosis:  S/p ORIF Right ankle Ankle Fx POD# 5 and left ankle fracture/irrigation of left ankle wound    Pt seen and evaluated. No acute complaints. Chinese  services used ID#[id number]  As per pt, Splint feels comfortable.  Denies CP/SOB, dyspnea, paresthesias    Vital Signs Last 24 Hrs  T(C): 36.7 (07 Jan 2019 05:05), Max: 37.2 (06 Jan 2019 14:52)  T(F): 98 (07 Jan 2019 05:05), Max: 99 (06 Jan 2019 14:52)  HR: 66 (07 Jan 2019 05:05) (66 - 92)  BP: 109/60 (07 Jan 2019 05:05) (106/74 - 131/77)  BP(mean): --  RR: 16 (07 Jan 2019 05:05) (16 - 17)  SpO2: 99% (07 Jan 2019 05:05) (97% - 99%)    Physical Exam:  B/l Ankles- Toes are pink, warm. with SILT. No pain with PROM of toes. Good cap refill.  AO  Splint intact. Calves are soft and NT.     Labs:       Impression: 68yFemale S/p ORIF Right ankle Ankle Fx POD# 5 and left ankle fracture/irrigation of left ankle wound  -  Pain management  -  DVT prophylaxis with lovenox  -  Daily PT- NWB of the b/l Ankles  -  C/w post-op abx   -  D/c to rehab   -  case d/w Dr. Varela

## 2022-01-01 NOTE — PATIENT PROFILE ADULT - DO YOU FEEL UNSAFE AT SCHOOL?
[FreeTextEntry1] : Fussy infant, likely gas +/- PIA +/- change in what he's eating (less breast milk and more of a few different formulas). Reassured that this will resolve and reviewed ways to help. No red flags on history or PE but mom to call if inconsolable for >60 min, vomiting, poor feeding, fever, or other new symptoms.
not applicable

## 2022-02-02 VITALS
SYSTOLIC BLOOD PRESSURE: 169 MMHG | HEART RATE: 58 BPM | TEMPERATURE: 98 F | OXYGEN SATURATION: 100 % | RESPIRATION RATE: 16 BRPM | DIASTOLIC BLOOD PRESSURE: 73 MMHG

## 2022-02-02 NOTE — H&P ADULT - NSHPLABSRESULTS_GEN_ALL_CORE
12.1   10.07 )-----------( 196      ( 07 Feb 2022 07:49 )             39.0 12.1   10.07 )-----------( 196      ( 07 Feb 2022 07:49 )             39.0    02-07    145  |  107  |  21  ----------------------------<  122<H>  3.7   |  26  |  0.73    Ca    9.9      07 Feb 2022 07:49      PT/INR - ( 07 Feb 2022 07:49 )   PT: 11.2 sec;   INR: 0.93          PTT - ( 07 Feb 2022 07:49 )  PTT:32.6 sec

## 2022-02-02 NOTE — H&P ADULT - ASSESSMENT
72 y/o F w/ PMHx of HTN, HLD, asthma (denies intubations/hospitalization), NIDDM  who is referred to [facility] for cardiac catheterization w/ possible intervention if clinically indicated 2/2   patient's risk factors, abnormal CCTA results, and CCS class IV anginal/anginal-equivalent symptoms.       H/H . Pt denies bleeding, GI bleeding, hematemesis, hematuria, BRBPR or melena . Pt. loaded with  mg PO X1 and Plavix 600 mg PO X 1  pre-cath.  Cr.   IV NS@ 250 cc bolus X 1 given precath.     Risks & benefits of procedure and alternative therapy have been explained to the patient including but not limited to: allergic reaction, bleeding w/possible need for blood transfusion, infection, renal and vascular compromise, limb damage, arrhythmia, stroke, vessel dissection/perforation, Myocardial infarction, emergent CABG. Informed consent obtained and in chart   70 y/o Cantonese Speaking  F w/ PMHx of HTN, HLD, asthma (denies intubations/hospitalization), NIDDM  who is referred to [facility] for cardiac catheterization w/ possible intervention if clinically indicated 2/2   patient's risk factors, abnormal CCTA results, and CCS class IV anginal/anginal-equivalent symptoms.     Labs as per i-stat; will f//u official labs.     H/H 2.6/40.3  . Pt denies bleeding, GI bleeding, hematemesis, hematuria, BRBPR or melena . Pt. loaded with  mg PO X1 and Plavix 600 mg PO X 1  pre-cath.  Cr.  0.7  IV NS@ 250 cc bolus X 1 given pre-cath.   Pt. precath consented with help of Nithyaonese Language ID # [id number]    Risks & benefits of procedure and alternative therapy have been explained to the patient including but not limited to: allergic reaction, bleeding w/possible need for blood transfusion, infection, renal and vascular compromise, limb damage, arrhythmia, stroke, vessel dissection/perforation, Myocardial infarction, emergent CABG. Informed consent obtained and in chart   72 y/o Cantonese Speaking  F w/ PMHx of HTN, HLD, asthma (denies intubations/hospitalization), NIDDM  who is referred to [facility] for cardiac catheterization w/ possible intervention if clinically indicated 2/2 patient's risk factors, abnormal CCTA results, and CCS class IV anginal/anginal-equivalent symptoms.       H/H 12.1/39.0  . Pt denies bleeding, GI bleeding, hematemesis, hematuria, BRBPR or melena . Pt. loaded with  mg PO X1 and Plavix 600 mg PO X 1  pre-cath.  Cr.  0.7,  IV NS@ 250 cc bolus X 1 given pre-cath.   Pt. pre- cath consented with help of Angelae Language ID # [id number].     Risks & benefits of procedure and alternative therapy have been explained to the patient including but not limited to: allergic reaction, bleeding w/possible need for blood transfusion, infection, renal and vascular compromise, limb damage, arrhythmia, stroke, vessel dissection/perforation, Myocardial infarction, emergent CABG. Informed consent obtained and in chart

## 2022-02-02 NOTE — H&P ADULT - HISTORY OF PRESENT ILLNESS
COVID:  Pharmacy:  Escort:  Cardiologist: Dr. Garcia    *Please confirmed meds on arrival  72 y/o Female w/ PMHx of HTN, HLD, asthma (denies intubations/hospitalization) and NIDDM initially presented to Cardiologist Dr. Garcia referred by PCP Dr. Riley for evaluation of CP. Pt reports experiencing intermittent non-radiating CP described as, "pressure" present at rest and independent of exertion and alleviated w/ rest for approximately the past year. Pt also endorses BREEN upon moderate exertion (walking uphill), which is also alleviated w/ rest also for the past year. Pt denies palpitations, dizziness, LOC, N/V/D, fever/chills/sick contact, diaphoresis, orthopnea/PND, and leg swelling. Office EKG 12/13/21: NSR @73bpm w/ TWI in leads II, V2-V6. CCTA 1/3/22: Ca score 162 Agatston units (60th percentile adjusted), moderate mixed disease in pLAD, mild-mod mixed disease in p/mRCA, dilated ascending aorta 40mm at level of main pulmonary artery, normal LV function. Echo 6/11/2020: Normal biV size and systolic function, EF 60-65%, impaired LV relaxation, trace AR.  In light of patient's risk factors, abnormal CCTA results, and CCS class 4 anginal/anginal-equivalent symptoms, patient is referred to [facility] for cardiac catheterization w/ possible intervention if clinically indicated. COVID:  Pharmacy:  Escort:  Cardiologist: Dr. Garcia  PMD: Dr. Genny Riley    *Please confirmed meds on arrival    70 y/o Female w/ PMHx of HTN, HLD, asthma (denies intubations/hospitalization), NIDDM initially presented to Cardiologist Dr. Garcia c/o intermittent non-radiating SSCP described as "pressure" present occurring independent of exertion for approximately the past year. Pt also endorses BREEN upon moderate exertion (walking uphill), which is also alleviated w/ rest also for the past year. Pt denies palpitations, dizziness, LOC, N/V/D, fever/chills/sick contact, diaphoresis, orthopnea/PND, and leg swelling. Office EKG 12/13/21: NSR @73bpm w/ TWI in leads II, V2-V6. CCTA 1/3/22: Ca score 162 Agatston units (60th percentile adjusted), moderate mixed disease in pLAD, mild-mod mixed disease in p/mRCA, dilated ascending aorta 40mm at level of main pulmonary artery, normal LV function. Echo 6/11/2020: Normal biV size and systolic function, EF 60-65%, impaired LV relaxation, trace AR.  In light of patient's risk factors, abnormal CCTA results, and CCS class IV anginal/anginal-equivalent symptoms, patient is referred to [facility] for cardiac catheterization w/ possible intervention if clinically indicated. COVID:  Pharmacy:  Escort:  Cardiologist: Dr. Garcia  PMD: Dr. Genny Riley    *Please confirmed meds on arrival    70 y/o Female w/ PMHx of HTN, HLD, asthma (denies intubations/hospitalization), NIDDM initially presented to Cardiologist Dr. Garcia c/o intermittent non-radiating SSCP described as "pressure" present occurring independent of exertion for approximately the past year. Pt also endorses BREEN upon moderate exertion (walking uphill), which is also alleviated w/ rest also for the past year. Pt denies palpitations, dizziness, LOC, N/V/D, fever/chills/sick contact, diaphoresis, orthopnea/PND, and leg swelling. Office EKG 12/13/21: NSR @73bpm w/ TWI in leads II, V2-V6. CCTA 1/3/22: Ca score 162 Agatston units (60th percentile adjusted), moderate mixed disease in pLAD, mild-mod mixed disease in p/mRCA, dilated ascending aorta 40mm at level of main pulmonary artery, normal LV function. Echo 6/11/2020: Normal biV size and systolic function, EF 60-65%, impaired LV relaxation, trace AI. In light of patient's risk factors, abnormal CCTA results, and CCS class IV anginal/anginal-equivalent symptoms, patient is referred to [facility] for cardiac catheterization w/ possible intervention if clinically indicated.     COVID 2/4/22 neg (HIE)   Pharmacy:  Escort:  Cardiologist: Dr. Garcia  PMD: Dr. Genny Riley      72 y/o Female w/ PMHx of HTN, HLD, asthma (denies intubations/hospitalization), NIDDM initially presented to Cardiologist Dr. Garcia c/o intermittent non-radiating SSCP described as "pressure" present occurring independent of exertion for approximately the past year. Pt also endorses BREEN upon moderate exertion (walking uphill), which is also alleviated w/ rest also for the past year. Pt denies palpitations, dizziness, LOC, N/V/D, fever/chills/sick contact, diaphoresis, orthopnea/PND, and leg swelling. Office EKG 12/13/21: NSR @73bpm w/ TWI in leads II, V2-V6. CCTA 1/3/22: Ca score 162 Agatston units (60th percentile adjusted), moderate mixed disease in pLAD, mild-mod mixed disease in p/mRCA, dilated ascending aorta 40mm at level of main pulmonary artery, normal LV function. Echo 6/11/2020: Normal biV size and systolic function, EF 60-65%, impaired LV relaxation, trace AI. In light of patient's risk factors, abnormal CCTA results, and CCS class IV anginal/anginal-equivalent symptoms, patient is referred to [facility] for cardiac catheterization w/ possible intervention if clinically indicated.     COVID 2/4/22 neg (HIE)   Pharmacy: [facility]  ([address] [place] [address], [id number])  Escort: son  Cardiologist: Dr. Garcia  PMD: Dr. Genny Riley    Meds reviewed with bottles at bedside.     70 y/o Cantonese Speaking F w/ PMHx of HTN, HLD, asthma (denies intubations/hospitalization), NIDDM initially presented to Cardiologist Dr. Garcia c/o intermittent non-radiating SSCP described as "pressure" present occurring independent of exertion for approximately the past year. Pt also endorses BREEN upon moderate exertion (walking uphill), which is also alleviated w/ rest also for the past year. Pt denies palpitations, dizziness, LOC, N/V/D, fever/chills/sick contact, diaphoresis, orthopnea/PND, and leg swelling. Office EKG 12/13/21: NSR @73bpm w/ TWI in leads II, V2-V6. CCTA 1/3/22: Ca score 162 Agatston units (60th percentile adjusted), moderate mixed disease in pLAD, mild-mod mixed disease in p/mRCA, dilated ascending aorta 40mm at level of main pulmonary artery, normal LV function. Echo 6/11/2020: Normal biV size and systolic function, EF 60-65%, impaired LV relaxation, trace AI. In light of patient's risk factors, abnormal CCTA results, and CCS class IV anginal/anginal-equivalent symptoms, patient is referred to [facility] for cardiac catheterization w/ possible intervention if clinically indicated.

## 2022-02-02 NOTE — H&P ADULT - NSICDXPASTMEDICALHX_GEN_ALL_CORE_FT
PAST MEDICAL HISTORY:  Asthma     DM (diabetes mellitus)     HLD (hyperlipidemia)     Hypertension

## 2022-02-02 NOTE — H&P ADULT - NSICDXPASTSURGICALHX_GEN_ALL_CORE_FT
PAST SURGICAL HISTORY:  No significant past surgical history      PAST SURGICAL HISTORY:  History of surgery of uterus     S/P nasal surgery

## 2022-02-07 ENCOUNTER — OUTPATIENT (OUTPATIENT)
Dept: OUTPATIENT SERVICES | Facility: HOSPITAL | Age: 72
LOS: 1 days | Discharge: HOME | End: 2022-02-07
Payer: MEDICARE

## 2022-02-07 DIAGNOSIS — Z98.890 OTHER SPECIFIED POSTPROCEDURAL STATES: Chronic | ICD-10-CM

## 2022-02-07 LAB
A1C WITH ESTIMATED AVERAGE GLUCOSE RESULT: 6.4 % — HIGH (ref 4–5.6)
ANION GAP SERPL CALC-SCNC: 12 MMOL/L — SIGNIFICANT CHANGE UP (ref 5–17)
APTT BLD: 32.6 SEC — SIGNIFICANT CHANGE UP (ref 27.5–35.5)
BASOPHILS # BLD AUTO: 0.09 K/UL — SIGNIFICANT CHANGE UP (ref 0–0.2)
BASOPHILS NFR BLD AUTO: 0.9 % — SIGNIFICANT CHANGE UP (ref 0–2)
BUN SERPL-MCNC: 21 MG/DL — SIGNIFICANT CHANGE UP (ref 7–23)
CALCIUM SERPL-MCNC: 9.9 MG/DL — SIGNIFICANT CHANGE UP (ref 8.4–10.5)
CHLORIDE SERPL-SCNC: 107 MMOL/L — SIGNIFICANT CHANGE UP (ref 96–108)
CHOLEST SERPL-MCNC: 140 MG/DL — SIGNIFICANT CHANGE UP
CO2 SERPL-SCNC: 26 MMOL/L — SIGNIFICANT CHANGE UP (ref 22–31)
CREAT SERPL-MCNC: 0.73 MG/DL — SIGNIFICANT CHANGE UP (ref 0.5–1.3)
EOSINOPHIL # BLD AUTO: 0.53 K/UL — HIGH (ref 0–0.5)
EOSINOPHIL NFR BLD AUTO: 5.3 % — SIGNIFICANT CHANGE UP (ref 0–6)
ESTIMATED AVERAGE GLUCOSE: 137 MG/DL — HIGH (ref 68–114)
GLUCOSE BLDC GLUCOMTR-MCNC: 117 MG/DL — HIGH (ref 70–99)
GLUCOSE BLDC GLUCOMTR-MCNC: 126 MG/DL — HIGH (ref 70–99)
GLUCOSE SERPL-MCNC: 122 MG/DL — HIGH (ref 70–99)
HCT VFR BLD CALC: 39 % — SIGNIFICANT CHANGE UP (ref 34.5–45)
HDLC SERPL-MCNC: 67 MG/DL — SIGNIFICANT CHANGE UP
HGB BLD-MCNC: 12.1 G/DL — SIGNIFICANT CHANGE UP (ref 11.5–15.5)
IMM GRANULOCYTES NFR BLD AUTO: 1.3 % — SIGNIFICANT CHANGE UP (ref 0–1.5)
INR BLD: 0.93 — SIGNIFICANT CHANGE UP (ref 0.88–1.16)
ISTAT INR: 1.1 — SIGNIFICANT CHANGE UP (ref 0.88–1.16)
ISTAT PT: 13.5 SEC — HIGH (ref 10–12.9)
LIPID PNL WITH DIRECT LDL SERPL: 51 MG/DL — SIGNIFICANT CHANGE UP
LYMPHOCYTES # BLD AUTO: 2.23 K/UL — SIGNIFICANT CHANGE UP (ref 1–3.3)
LYMPHOCYTES # BLD AUTO: 22.1 % — SIGNIFICANT CHANGE UP (ref 13–44)
MCHC RBC-ENTMCNC: 27.8 PG — SIGNIFICANT CHANGE UP (ref 27–34)
MCHC RBC-ENTMCNC: 31 GM/DL — LOW (ref 32–36)
MCV RBC AUTO: 89.4 FL — SIGNIFICANT CHANGE UP (ref 80–100)
MONOCYTES # BLD AUTO: 0.6 K/UL — SIGNIFICANT CHANGE UP (ref 0–0.9)
MONOCYTES NFR BLD AUTO: 6 % — SIGNIFICANT CHANGE UP (ref 2–14)
NEUTROPHILS # BLD AUTO: 6.49 K/UL — SIGNIFICANT CHANGE UP (ref 1.8–7.4)
NEUTROPHILS NFR BLD AUTO: 64.4 % — SIGNIFICANT CHANGE UP (ref 43–77)
NON HDL CHOLESTEROL: 73 MG/DL — SIGNIFICANT CHANGE UP
NRBC # BLD: 0 /100 WBCS — SIGNIFICANT CHANGE UP (ref 0–0)
PLATELET # BLD AUTO: 196 K/UL — SIGNIFICANT CHANGE UP (ref 150–400)
POCT ISTAT CREATININE: 0.7 MG/DL — SIGNIFICANT CHANGE UP (ref 0.5–1.3)
POTASSIUM SERPL-MCNC: 3.7 MMOL/L — SIGNIFICANT CHANGE UP (ref 3.5–5.3)
POTASSIUM SERPL-SCNC: 3.7 MMOL/L — SIGNIFICANT CHANGE UP (ref 3.5–5.3)
PROTHROM AB SERPL-ACNC: 11.2 SEC — SIGNIFICANT CHANGE UP (ref 10.6–13.6)
RBC # BLD: 4.36 M/UL — SIGNIFICANT CHANGE UP (ref 3.8–5.2)
RBC # FLD: 14 % — SIGNIFICANT CHANGE UP (ref 10.3–14.5)
SODIUM SERPL-SCNC: 145 MMOL/L — SIGNIFICANT CHANGE UP (ref 135–145)
TRIGL SERPL-MCNC: 110 MG/DL — SIGNIFICANT CHANGE UP
WBC # BLD: 10.07 K/UL — SIGNIFICANT CHANGE UP (ref 3.8–10.5)
WBC # FLD AUTO: 10.07 K/UL — SIGNIFICANT CHANGE UP (ref 3.8–10.5)

## 2022-02-07 PROCEDURE — 99153 MOD SED SAME PHYS/QHP EA: CPT

## 2022-02-07 PROCEDURE — 85025 COMPLETE CBC W/AUTO DIFF WBC: CPT

## 2022-02-07 PROCEDURE — 93572 IV DOP VEL&/PRESS C FLO EA: CPT | Mod: 26,RC

## 2022-02-07 PROCEDURE — 82565 ASSAY OF CREATININE: CPT

## 2022-02-07 PROCEDURE — 80048 BASIC METABOLIC PNL TOTAL CA: CPT

## 2022-02-07 PROCEDURE — 83036 HEMOGLOBIN GLYCOSYLATED A1C: CPT

## 2022-02-07 PROCEDURE — 85730 THROMBOPLASTIN TIME PARTIAL: CPT

## 2022-02-07 PROCEDURE — 36415 COLL VENOUS BLD VENIPUNCTURE: CPT

## 2022-02-07 PROCEDURE — 93458 L HRT ARTERY/VENTRICLE ANGIO: CPT | Mod: 26

## 2022-02-07 PROCEDURE — 82962 GLUCOSE BLOOD TEST: CPT

## 2022-02-07 PROCEDURE — 80061 LIPID PANEL: CPT

## 2022-02-07 PROCEDURE — 93571 IV DOP VEL&/PRESS C FLO 1ST: CPT | Mod: 26,LD

## 2022-02-07 PROCEDURE — 99152 MOD SED SAME PHYS/QHP 5/>YRS: CPT

## 2022-02-07 PROCEDURE — 93010 ELECTROCARDIOGRAM REPORT: CPT

## 2022-02-07 PROCEDURE — C1769: CPT

## 2022-02-07 PROCEDURE — 93571 IV DOP VEL&/PRESS C FLO 1ST: CPT | Mod: LD

## 2022-02-07 PROCEDURE — C1887: CPT

## 2022-02-07 PROCEDURE — C1894: CPT

## 2022-02-07 PROCEDURE — 93005 ELECTROCARDIOGRAM TRACING: CPT

## 2022-02-07 PROCEDURE — 93458 L HRT ARTERY/VENTRICLE ANGIO: CPT

## 2022-02-07 PROCEDURE — 93572 IV DOP VEL&/PRESS C FLO EA: CPT | Mod: RC

## 2022-02-07 PROCEDURE — 85610 PROTHROMBIN TIME: CPT

## 2022-02-07 RX ORDER — ASPIRIN/CALCIUM CARB/MAGNESIUM 324 MG
0 TABLET ORAL
Qty: 0 | Refills: 0 | DISCHARGE

## 2022-02-07 RX ORDER — DEXTROSE 50 % IN WATER 50 %
15 SYRINGE (ML) INTRAVENOUS ONCE
Refills: 0 | Status: DISCONTINUED | OUTPATIENT
Start: 2022-02-07 | End: 2022-02-21

## 2022-02-07 RX ORDER — CLOPIDOGREL BISULFATE 75 MG/1
600 TABLET, FILM COATED ORAL ONCE
Refills: 0 | Status: COMPLETED | OUTPATIENT
Start: 2022-02-07 | End: 2022-02-07

## 2022-02-07 RX ORDER — SODIUM CHLORIDE 9 MG/ML
1000 INJECTION, SOLUTION INTRAVENOUS
Refills: 0 | Status: DISCONTINUED | OUTPATIENT
Start: 2022-02-07 | End: 2022-02-21

## 2022-02-07 RX ORDER — AZILSARTAN KAMEDOXOMIL 40 MG/1
1 TABLET ORAL
Qty: 0 | Refills: 0 | DISCHARGE

## 2022-02-07 RX ORDER — SODIUM CHLORIDE 9 MG/ML
250 INJECTION INTRAMUSCULAR; INTRAVENOUS; SUBCUTANEOUS ONCE
Refills: 0 | Status: COMPLETED | OUTPATIENT
Start: 2022-02-07 | End: 2022-02-07

## 2022-02-07 RX ORDER — GLUCAGON INJECTION, SOLUTION 0.5 MG/.1ML
1 INJECTION, SOLUTION SUBCUTANEOUS ONCE
Refills: 0 | Status: DISCONTINUED | OUTPATIENT
Start: 2022-02-07 | End: 2022-02-21

## 2022-02-07 RX ORDER — DEXTROSE 50 % IN WATER 50 %
25 SYRINGE (ML) INTRAVENOUS ONCE
Refills: 0 | Status: DISCONTINUED | OUTPATIENT
Start: 2022-02-07 | End: 2022-02-21

## 2022-02-07 RX ORDER — ENOXAPARIN SODIUM 100 MG/ML
40 INJECTION SUBCUTANEOUS
Qty: 0 | Refills: 0 | DISCHARGE

## 2022-02-07 RX ORDER — ATORVASTATIN CALCIUM 80 MG/1
1 TABLET, FILM COATED ORAL
Qty: 0 | Refills: 0 | DISCHARGE

## 2022-02-07 RX ORDER — METFORMIN HYDROCHLORIDE 850 MG/1
1 TABLET ORAL
Qty: 0 | Refills: 0 | DISCHARGE

## 2022-02-07 RX ORDER — METOPROLOL TARTRATE 50 MG
1 TABLET ORAL
Qty: 0 | Refills: 0 | DISCHARGE

## 2022-02-07 RX ORDER — AMLODIPINE BESYLATE 2.5 MG/1
1 TABLET ORAL
Qty: 0 | Refills: 0 | DISCHARGE

## 2022-02-07 RX ORDER — DEXTROSE 50 % IN WATER 50 %
12.5 SYRINGE (ML) INTRAVENOUS ONCE
Refills: 0 | Status: DISCONTINUED | OUTPATIENT
Start: 2022-02-07 | End: 2022-02-21

## 2022-02-07 RX ORDER — INSULIN LISPRO 100/ML
VIAL (ML) SUBCUTANEOUS ONCE
Refills: 0 | Status: COMPLETED | OUTPATIENT
Start: 2022-02-07 | End: 2022-02-07

## 2022-02-07 RX ORDER — ASPIRIN/CALCIUM CARB/MAGNESIUM 324 MG
325 TABLET ORAL ONCE
Refills: 0 | Status: COMPLETED | OUTPATIENT
Start: 2022-02-07 | End: 2022-02-07

## 2022-02-07 RX ORDER — SODIUM CHLORIDE 9 MG/ML
500 INJECTION INTRAMUSCULAR; INTRAVENOUS; SUBCUTANEOUS
Refills: 0 | Status: DISCONTINUED | OUTPATIENT
Start: 2022-02-07 | End: 2022-02-21

## 2022-02-07 RX ORDER — CHLORHEXIDINE GLUCONATE 213 G/1000ML
1 SOLUTION TOPICAL ONCE
Refills: 0 | Status: DISCONTINUED | OUTPATIENT
Start: 2022-02-07 | End: 2022-02-07

## 2022-02-07 RX ORDER — ZOLPIDEM TARTRATE 10 MG/1
1 TABLET ORAL
Qty: 0 | Refills: 0 | DISCHARGE

## 2022-02-07 RX ORDER — CEPHALEXIN 500 MG
1 CAPSULE ORAL
Qty: 0 | Refills: 0 | DISCHARGE
End: 2019-01-16

## 2022-02-07 RX ORDER — POTASSIUM CHLORIDE 20 MEQ
20 PACKET (EA) ORAL ONCE
Refills: 0 | Status: COMPLETED | OUTPATIENT
Start: 2022-02-07 | End: 2022-02-07

## 2022-02-07 RX ADMIN — CLOPIDOGREL BISULFATE 600 MILLIGRAM(S): 75 TABLET, FILM COATED ORAL at 08:50

## 2022-02-07 RX ADMIN — Medication 20 MILLIEQUIVALENT(S): at 08:50

## 2022-02-07 RX ADMIN — SODIUM CHLORIDE 500 MILLILITER(S): 9 INJECTION INTRAMUSCULAR; INTRAVENOUS; SUBCUTANEOUS at 08:51

## 2022-02-07 RX ADMIN — Medication 325 MILLIGRAM(S): at 08:50

## 2022-02-07 NOTE — PROGRESS NOTE ADULT - SUBJECTIVE AND OBJECTIVE BOX
Interventional Cardiology PA SDA Discharge Note    Patient without complaints. Ambulated and voided without difficulties    Afebrile, VSS    Ext:  Right Radial:  no hematoma,  no bleeding, dressing; C/D/I  Pulses:    intact RAD/DP/PT to baseline     A/P:  70 y/o Cantonese Speaking F w/ PMHx of HTN, HLD, asthma (denies intubations/hospitalization), NIDDM initially presented to Cardiologist Dr. Gracia c/o intermittent non-radiating SSCP described as "pressure" present occurring independent of exertion for approximately the past year. Pt also endorses BREEN upon moderate exertion (walking uphill), which is also alleviated w/ rest also for the past year. Pt denies palpitations, dizziness, LOC, N/V/D, fever/chills/sick contact, diaphoresis, orthopnea/PND, and leg swelling. Office EKG 12/13/21: NSR @73bpm w/ TWI in leads II, V2-V6. CCTA 1/3/22: Ca score 162 Agatston units (60th percentile adjusted), moderate mixed disease in pLAD, mild-mod mixed disease in p/mRCA, dilated ascending aorta 40mm at level of main pulmonary artery, normal LV function. Echo 6/11/2020: Normal biV size and systolic function, EF 60-65%, impaired LV relaxation, trace AI. In light of patient's risk factors, abnormal CCTA results, and CCS class IV anginal/anginal-equivalent symptoms, patient is referred to [facility] for cardiac catheterization w/ possible intervention if clinically indicated.    Pt is s/p Diagnostic Cath 2/7 revealing normal LM, pLAD 50% (non-obstructive, FFR 0.87).  LCx luminal irregularities.  mid tandem 50%, non-obstructive (FFR 0.96). LVEF 55%, EDP 10.   No significant AS or MR.  Pt is to hold Metformin x 48 hours and restart on Wednesday, 2/9/22.  She is to continue all other medications as prescribed.      1.	Stable for discharge as per attending Dr. Garcia after bed rest, pt voids, groin/wrist stable and 30 minutes of ambulation.  2.	Follow-up with Cardiologist, Dr. Garcia on Wednesday, 2/9/22   3.	Discharged forms signed and copies in chart

## 2022-02-11 DIAGNOSIS — R94.39 ABNORMAL RESULT OF OTHER CARDIOVASCULAR FUNCTION STUDY: ICD-10-CM

## 2022-02-11 DIAGNOSIS — I25.110 ATHEROSCLEROTIC HEART DISEASE OF NATIVE CORONARY ARTERY WITH UNSTABLE ANGINA PECTORIS: ICD-10-CM

## 2022-06-22 NOTE — PROGRESS NOTE ADULT - REASON FOR ADMISSION
Labs with in 1 -2 weeks before appointment (not prior prior to this time frame).  FYI: medicare does not cover any labs under Wellness or Routine health diagnoses.  That's why it didn't go through.    
bilateral ankle fractures

## 2023-01-01 NOTE — ED PROVIDER NOTE - CHIEF COMPLAINT
"  Name: Female-Milton Anne \"Dylon Tate\"  74 days old, CGA 40w4d  Birth:2024 4:53 AM   Gestational Age: 30w0d, 3 lb 2.8 oz (1440 g)    Extended Emergency Contact Information  Primary Emergency Contact: MILTON ANNE  Home Phone: [phone]  Mobile Phone: [phone]  Relation: Mother  Secondary Emergency Contact: Day Day  Mobile Phone: [phone]  Relation: Unknown   Maternal history: PTL and concern for abruption and uterine rupture through previous incision    Mom has known lead poisoning, breast milk has been tested and ok to use    Infant history: born at , transferred to [facility], transferred to [facility] 9/1/23    Zee interpretor needed     Last 3 weights:  Vitals:    10/24/23 0100 10/25/23 0400 10/26/23 0030   Weight: 3.615 kg (7 lb 15.5 oz) 3.645 kg (8 lb 0.6 oz) 3.67 kg (8 lb 1.5 oz)     Weight change: 0.025 kg (0.9 oz)                Vital signs (past 24 hours)   Temp:  [98.2  F (36.8  C)-98.9  F (37.2  C)] 98.2  F (36.8  C)  Pulse:  [134-178] 177  Resp:  [39-64] 57  BP: (84-94)/(40-61) 84/40  SpO2:  [94 %-100 %] 100 %   Intake:  Output:  Stool:  Em/asp: 478  X 8  X 0  X 0 ml/kg/day   kcal/kg/day     goal ml/kg      132  88    130                 Lines/Tubes: NG       Diet: Eren Sure  20 kcal, IDF:  /38/58  ( Feed no longer between than every 3.5 hours)    PO:  66% (46, 38, 59, 53, 79, 100%)  NT put back 10/20    FRS: 7/8    HOB flat since 10/2        LABS/RESULTS/MEDS/HISTORY PLAN   FEN: 10/18 PVS 0.5 ml daily for home  Zinc 8.8mg/kg/d  discontinue at discharge  Glycerine Suppository    Prune Juice daily  NaCl 2 mEq/kg/day (started 9/10-9/29)  Vitamin D, stop 9/11    History of Hypoglycemia  Lab Results   Component Value Date     2023    POTASSIUM 4.5 2023    CHLORIDE 103 2023    CO2 28 2023    BUN 12.9 2023    CR 0.34 2023    GLC 85 2023    MEREDITH 10.1 2023     Fortified on 8/17  Full feedings on 8/19  History of UVC [ x ] " The patient is a 68y Female complaining of ankle pain/injury. "discuss swallow study Monday if still poor feeding/poor stamina.     10/24- nectar thickened = (27cal/ml)    Wt adjust 130/kg :               Resp: RA  A/B: Last: 10/18 x 1 SR     1/16 OTW off 10-17 0800    Caffeine- Last dose 9/24  Diuril 20 mg/kg every 12 hours (started 9/16)- Let Outgrowdc'd 10/12  9/22-9/29 Pulmicort   Lasix intermittently  9/5, 9/13, 9/16  10/15 R/A from 1/8 LPM OTW  10/2 - Trial LFNC for feeding stamina  9/30-10/2 RA  9/23-9/30 LFNC 1/2 9//20-9/23 HFNC 2LPM   9/8-9/20 HFNC 3LPM  9/2 -9/8 HFNC 4 LPM  8/14 - 9/2 CPAP       CV: 9/11 Echo: stretched PFO vs. ASD with L to R flow. Normal function  10/11 PFO vs ASD follow up with cards appt in 6 months  [ x ] will need cards appointment with echo at 6 months after discharge. 4/5/24 at 8:30AM   ID: Date Cultures/Labs Treatment (# of days)       9/10        9/29 Birth BC negative    Eye Left  Gram stain: 1+ gram + cocci  Eye Right  Gram stain: 3 WBCs  Thrush Amp/Gent x 48 hours      9/10-_polytrim each eye       Nystatin x 5 days last dose 10/3   No results found for: \"CRPI\"        Heme: Poly-vi-sol 0.5 mL  Lab Results   Component Value Date    HGB 11.4 2023    HGB 11.2 2023    RADHA 63 2023     Retic: 1.9% Lead level 10/20- <2.0    *peds to follow lead levels as outpatient        GI/  Jaundice Lab Results   Component Value Date    BILITOTAL 1.8 (H) 2023    BILITOTAL 4.4 2023    DBIL 0.43 (H) 2023    DBIL 0.45 (H) 2023        Photo hx-discontinued 8/19  Mom type: o+. Ab negative  Baby type:  O+, CROW negative Resolved   Neuro:  ROP HUS: 8/20-normal 9/25- normal     ROP 9/13: Zone 3, Stage 0 No plus bilaterally   10/16 Mature.  Follow up in 6 months.       Endo: NMS: 1.  Borderline AA & FA&Barts      2.  8/27 FA & Barts      3. 9/10: normal     Other:  (Lead level 9/16 2 (nml)   (3.2, 6.3, 7.1 3.3 4.5 4.9 4.5)  nml is <3  She is now <2      Elevated Lead levels ok to use mother's milk because mothers level is now " "less than 40 and baby's is less than 10 (per the AAP and CDC recommendations)     Mother was recommended to have monthly lead level checks until the level is less than 5.      From Mom's history: Lexy DAVIS went with Zee  and checked house for lead sources, none were identified besides potentially some spices, which were sent for testing. Result of spice testing is unknown.      If need further lead level use order \"[id number]\"    OT placed splints on hands       Exam: General: Infant sleeping during exam  Skin: pink, warm, intact; no rashes or lesions noted.  HEENT: anterior fontanelle soft and flat.  NG in place.   Lungs: clear and equal bilaterally, no work of breathing.   Heart: normal rate, rhythm; grade 2/6 murmur noted; pulses 2+ in all four extremities.   Abdomen: soft with positive bowel sounds.  : normal female genitalia for gestational age.  Musculoskeletal: normal movement with full range of motion.  Neurologic: normal, symmetric tone and strength.  Namrata Akins, APRN CNP 10/26/23  12:59PM   Parent update: by Dr Lan after rounds with .        ROP/  HCM: Most Recent Immunizations   Administered Date(s) Administered    DTAP-IPV/HIB (PENTACEL) 2023    Hepatitis B, Peds 2023    Pneumo Conj 13-V (2010&after) 2023     2 mo immunizations due this week (10/12) phar. Given 10/12/23    CCHD ECHO   CST ____     Hearing Passed 10/2      PCP: Rosemary Phan M.D.    Discharge planning:     [X] NICU F/U Clinic- February 28 at 11:00   [ x] cardiology appt.  Friday April 5, 2024 at 8:30AM  [  x] ROP 6 months. -Monday Apr 15, 2024 10:40 AM   [ x] [facility] :Nov 2, 2PM       "

## 2023-06-20 NOTE — PATIENT PROFILE ADULT - HAS THE PATIENT BEEN ADMITTED FROM SHORT TERM REHAB?
June 20, 2023       Kathleen Wyman CNP  [address] [facility] [address]  Via In Basket      Patient: Denene Hernandez   YOB: 1959   Date of Visit: 6/20/2023       Dear Dr. Wyman:    I saw your patient, Deneen Hernandez, for an evaluation. Below are my notes for this visit with her.    If you have questions, please do not hesitate to call me.      Sincerely,        Pili Sloan DO        CC: No Recipients  Pili Sloan DO  6/20/2023 10:08 AM  Signed  Deneen Hernandez  06/20/23  [id number]    CC: PTC, postsurgical hypothyroidism  Chief Complaint   Patient presents with   • Office Visit   • Thyroid Problem     Papillary carcinoma of thyroid         Referring Provider: Kathleen Wyman CNP    PCP: Kathleen Wyman CNP    HPI/To Review  64 year old year F here for follow up of MNG with elevated uptake on PET/CT in the thyroid gland (done in follow up for endometrial cancer). s/p FNA of dominant L nodule with Dr. Simspon on 9/10/18. FNA cytopath benign but of note, satisfactory hypocellular specimen   Diagnosed with endometrial cancer in 02/2018, TAHBSO 03/2018. Initially presented with abnormal vaginal bleeding starting first week of 01/2018. CT abd/pelvis in 03/2018 incidentally showed R sided cystic mass 4.5cm and L sided 1.5 cm nodule. PET/CT done in 08/2018 showed uptake in the thyroid with index regional uptake in the R inferior lobe. Pt had subsequent US thyroid 09/2018 that showed bilateral thyroid nodules   R 4.0 x 3.7 x 2.7 cm mixed solid and cystic   L 3.1 x 2.1 x 2.1 cm solid hypoechoic  L 1.3 x 1.1 x 1.0 cm solid hypoechoic   L 0.8 x 0.6 x 0.4 cm solid hypoechoic      Biopsy history:  -FNA of L 3.1 cm nodule: Phoenix II, hypocellular, reported as satisfactory      Family history of thyroid cancer: none      History of XRT to head or neck, or exposure to nuclear materials: none but had XRT to lower body for uterine cancer    11/2018  L lobe nodule - hypocellular, benign. R lobe nodule - atypical, no  definitive malignancy, follicular neoplasm, hurthle cell type with cystic changes, bethesda IV    12/2018  ThyraMIR testing done - ThyraMIR positive, 90% risk of malignancy. \"high risk of malignancy\". +KRAS mutation    04/2019  TTx at [facility]. Surgical path report:   TUMOR     Histologic Type:   Papillary carcinoma (5.2 cm), conventional (papillary)     type, with predominant follicular architecture and oncocytic features, right     thyroid lobe. Additional microscopic focus of papillary thyroid carcinoma (0.4     cm), infiltrating follicular variant, right thyroid lobe         Tumor Extent          Extrathyroidal Extension:   Not identified     Accessory Findings          Angioinvasion (vascular invasion):   Not identified     Lymphatic Invasion:   Not identified     MARGINS          Margins:   Negative for tumor     LYMPH NODES     Not performed     PATHOLOGIC STAGE CLASSIFICATION (pTNM, AJCC 8th Edition)          TNM Descriptors:   pT3a     Regional Lymph Nodes (pN):   NA          Distant Metastasis (pM):   NA       05/2019 TSH 0.282, FT4 1.6, Tg 1.4 (by LC/MS), denies any hyperthyroid symptoms   08/7/2019 Thyrogen stim , Stim Tg 4.0 FT4 2.2, received 80.1 mCi of ZHENG, on LT4 100 mcg daily   8/14/19 Posttherapy scan without evidence of metastatic disease   10/2019 Tg 0.3 Tg Ab normal, TSH 0.093 FT4 1.5 FT3 2.2  10/2019 US thyroid no evidence of sig residual disease no LAD  03/2020 TSH 0.040 FT4 1.5 FT3 2.8, lowered to LT4 88 mcg daily  06/2020 TSH 0.297 FT4 1.4 FT3 2.4, Tg 0.1 Tg Ab 8.0 on LT4 88 mcg daily  12/2020 TSH 0.580 Tg Ab 8.0 Tg <0.5 on LC-MS  12/2020 US thyroid with no evidence of recurrent disease or persistent disease   05/2021 TSH 3.548 Tg Ab 8.9 Tg <0.5 LC-MS due to Tg Ab, LT4 88-->100 mcg daily   06/2021 US MARTY  07/2021 TSH 0.116 FT4 1.5 FT3 2.7  10/2021 TSH 0.081 FT4 1.6  11/2021 TSH 0.139 FT4 1.6 Tg <0.5 LC-MS on LT4 100 mcg daily   01/2022 TSH 0.152 FT4 1.4 FT3 2.6   05/2022 TSH 0.197 FT4 1.4  FT3 2.5 Tg Ab by LC/MS <0.5  05/2022 US MARTY  11/2022 TSH 0.332 Tg 0.1 Tg Ab 2.1  05/2023 Tg 0.1 Tg Ab 2.6  05/2023 US 9mm LN with fatty eveline on right lateral side   05/2023 TSH 0.250 FT4 1.3    Taking euthyrox/LT4 100 mcg daily. Taking medication correctly.   No hyperthyroid symptoms     10/2020 CT with stable disease     Dr Devan Mayen [phone], F [phone], has appt with Dr Mayen on 3/20      Prediabetes   Hemoglobin A1C (%)   Date Value   11/12/2022 6.2 (H)             PMH/PSH  MNG  uterine cancer  anemia  anal mass  HTN  HLD  FNA of L thyroid nodule  PTC s/p TTx     FH  mother with breast cancer   varicose veins   father with glioblastoma, Muscular dystrophy    Social Hx  negative etoh  negative illicits  negative smoking   office work   lives alone      ALLERGIES:  No Known Allergies      ROS  A 12 Point ROS was negative except that listed in the HPI    Meds  Current Outpatient Medications   Medication Sig   • levothyroxine 100 MCG tablet Take 1 tablet by mouth daily.   • metoPROLOL tartrate (LOPRESSOR) 25 MG tablet TAKE 1 TABLET BY MOUTH IN THE MORNING AND 1 IN THE EVENING   • losartan (COZAAR) 25 MG tablet Take 1 tablet by mouth once daily   • hydroCHLOROthiazide (HYDRODIURIL) 25 MG tablet Take 1 tablet by mouth once daily   • Blood Pressure Kit Use daily     No current facility-administered medications for this visit.       Exam  Visit Vitals  /80   Pulse 63   Ht 5' 5\" (1.651 m)   Wt 82.6 kg (181 lb 15.8 oz)   BMI 30.28 kg/m²     Exam  General NAD, NCAT  HEENT EOMI  anterior neck scar healed well   CV RRR S1S2  Resp CTA bilaterally, no wheezes rales rhonchi  Abd soft nontender nondistended  Ext no edema   MSK normal ROM   Skin no rashes, no ecchymoses  Neuro alert and oriented x 3, no tremor   Psych cooperative noncombative     Labs/Diagnostics   -Reviewed in [facility]Care Everywhere    TSH (mcUnits/mL)   Date Value   05/27/2023 0.250 (L)   11/12/2022 0.332 (L)   05/07/2022 0.197 (L)   01/03/2022  0.152 (L)   11/23/2021 0.139 (L)   10/02/2021 0.081 (L)   07/31/2021 0.116 (L)   05/27/2021 3.548   12/10/2020 0.580   06/19/2020 0.297 (L)     No components found for: FREE T4  No components found for: FREE T3  No components found for: TOTAL T3    Assessment/Plan  Bilateral thyroid nodules with increased uptake on PET/CT done for follow up on endometrial cancer-->R lobe nodule with +ThyraMIR testing, PTC with 5.2 cm tumor, with oncocytic features s/p TTx in 04/2019 at [facility]  -would classify as MARK intermediate risk given size, with now good biochemical response   -s/p 80.1mCi on 8/2019 at [facility]  -stim Tg 4.0, posttherapy scan without evidence of metastatic disease  -05/2023 TSH at goal, keep LT4/euthyrox 100 mcg daily, Tg is low, Tg Ab is stable  -continue LT4 100 mcg daily   -no adverse effects from ZHENG at this time   -thyroid cancer follow up with combo of WBS/Tg/US thyroid   -postsurgical hypothyroidism: TSH at goal of 0.1-0.5  -no postsurgical hypoparathyroidism   -US thyroid 05/2023 is stable, repeat in 1 year     HTN  -stable on repeat         RTC 12 months    Thank you, Kathleen Pereira M, CNP, for allowing me to participate in the care of this patient!           no

## 2024-06-05 PROBLEM — Z86.79 HISTORY OF HYPERTENSION: Status: RESOLVED | Noted: 2024-06-05 | Resolved: 2024-06-05

## 2024-06-05 PROBLEM — Z86.39 HISTORY OF DIABETES MELLITUS: Status: RESOLVED | Noted: 2024-06-05 | Resolved: 2024-06-05

## 2024-06-05 PROBLEM — E78.5 HYPERLIPIDEMIA, UNSPECIFIED: Chronic | Status: ACTIVE | Noted: 2022-02-02

## 2024-06-05 PROBLEM — R91.1 PULMONARY NODULE: Status: ACTIVE | Noted: 2024-06-05

## 2024-06-05 PROBLEM — Z78.9 NON-SMOKER: Status: ACTIVE | Noted: 2024-06-05

## 2024-06-05 PROBLEM — Z00.00 ENCOUNTER FOR PREVENTIVE HEALTH EXAMINATION: Status: ACTIVE | Noted: 2024-06-05

## 2024-06-05 PROBLEM — J45.909 UNSPECIFIED ASTHMA, UNCOMPLICATED: Chronic | Status: ACTIVE | Noted: 2022-02-02

## 2024-06-05 PROBLEM — Z87.448 HISTORY OF CHRONIC KIDNEY DISEASE: Status: RESOLVED | Noted: 2024-06-05 | Resolved: 2024-06-05

## 2024-06-05 PROBLEM — E11.9 TYPE 2 DIABETES MELLITUS WITHOUT COMPLICATIONS: Chronic | Status: ACTIVE | Noted: 2022-02-02

## 2024-06-05 RX ORDER — OLMESARTAN MEDOXOMIL 20 MG/1
20 TABLET, FILM COATED ORAL DAILY
Refills: 0 | Status: ACTIVE | COMMUNITY

## 2024-06-05 RX ORDER — METFORMIN ER 500 MG 500 MG/1
500 TABLET ORAL TWICE DAILY
Refills: 0 | Status: ACTIVE | COMMUNITY

## 2024-06-05 RX ORDER — AZILSARTAN KAMEDOXOMIL 40 MG/1
40 TABLET ORAL DAILY
Refills: 0 | Status: ACTIVE | COMMUNITY

## 2024-06-05 RX ORDER — ATORVASTATIN CALCIUM 20 MG/1
20 TABLET, FILM COATED ORAL DAILY
Refills: 0 | Status: ACTIVE | COMMUNITY

## 2024-06-05 RX ORDER — METOPROLOL SUCCINATE 25 MG/1
25 TABLET, EXTENDED RELEASE ORAL DAILY
Refills: 0 | Status: ACTIVE | COMMUNITY

## 2024-06-05 RX ORDER — DIAPER,BRIEF,INFANT-TODD,DISP
600-400 EACH MISCELLANEOUS DAILY
Refills: 0 | Status: ACTIVE | COMMUNITY

## 2024-06-05 RX ORDER — AMLODIPINE BESYLATE 5 MG/1
5 TABLET ORAL DAILY
Refills: 0 | Status: ACTIVE | COMMUNITY

## 2024-06-05 RX ORDER — DAPAGLIFLOZIN 5 MG/1
5 TABLET, FILM COATED ORAL DAILY
Refills: 0 | Status: ACTIVE | COMMUNITY

## 2024-06-05 NOTE — SOCIAL HISTORY
[TextEntry] : COVID history TB history Denies major psychiatric history.  Occupation: retiree  Patient lives at home with family.  Negative for ETOH

## 2024-06-07 ENCOUNTER — APPOINTMENT (OUTPATIENT)
Dept: THORACIC SURGERY | Facility: CLINIC | Age: 74
End: 2024-06-07
Payer: MEDICARE

## 2024-06-07 VITALS
HEIGHT: 62 IN | HEART RATE: 76 BPM | DIASTOLIC BLOOD PRESSURE: 67 MMHG | OXYGEN SATURATION: 98 % | BODY MASS INDEX: 23.92 KG/M2 | WEIGHT: 130 LBS | SYSTOLIC BLOOD PRESSURE: 147 MMHG | TEMPERATURE: 97.6 F

## 2024-06-07 DIAGNOSIS — Z83.3 FAMILY HISTORY OF DIABETES MELLITUS: ICD-10-CM

## 2024-06-07 DIAGNOSIS — Z78.9 OTHER SPECIFIED HEALTH STATUS: ICD-10-CM

## 2024-06-07 DIAGNOSIS — R91.1 SOLITARY PULMONARY NODULE: ICD-10-CM

## 2024-06-07 DIAGNOSIS — Z87.09 PERSONAL HISTORY OF OTHER DISEASES OF THE RESPIRATORY SYSTEM: ICD-10-CM

## 2024-06-07 DIAGNOSIS — Z86.79 PERSONAL HISTORY OF OTHER DISEASES OF THE CIRCULATORY SYSTEM: ICD-10-CM

## 2024-06-07 DIAGNOSIS — Z87.448 PERSONAL HISTORY OF OTHER DISEASES OF URINARY SYSTEM: ICD-10-CM

## 2024-06-07 DIAGNOSIS — Z87.898 PERSONAL HISTORY OF OTHER SPECIFIED CONDITIONS: ICD-10-CM

## 2024-06-07 DIAGNOSIS — Z77.22 CONTACT WITH AND (SUSPECTED) EXPOSURE TO ENVIRONMENTAL TOBACCO SMOKE (ACUTE) (CHRONIC): ICD-10-CM

## 2024-06-07 DIAGNOSIS — Z86.39 PERSONAL HISTORY OF OTHER ENDOCRINE, NUTRITIONAL AND METABOLIC DISEASE: ICD-10-CM

## 2024-06-07 PROCEDURE — 99205 OFFICE O/P NEW HI 60 MIN: CPT

## 2024-06-11 PROBLEM — Z87.898 HISTORY OF MULTIPLE PULMONARY NODULES: Status: RESOLVED | Noted: 2024-06-11 | Resolved: 2024-06-11

## 2024-06-11 PROBLEM — Z83.3 FAMILY HISTORY OF TYPE 2 DIABETES MELLITUS: Status: ACTIVE | Noted: 2024-06-11

## 2024-06-11 PROBLEM — Z77.22 SECOND HAND SMOKE EXPOSURE: Status: ACTIVE | Noted: 2024-06-11

## 2024-06-11 PROBLEM — Z87.09 HISTORY OF BRONCHITIS: Status: RESOLVED | Noted: 2024-06-11 | Resolved: 2024-06-11

## 2024-06-11 NOTE — PHYSICAL EXAM
[Fully active, able to carry on all pre-disease performance without restriction] : Status 0 - Fully active, able to carry on all pre-disease performance without restriction [General Appearance - Alert] : alert [General Appearance - In No Acute Distress] : in no acute distress [Neck Appearance] : the appearance of the neck was normal [Neck Cervical Mass (___cm)] : no neck mass was observed [Jugular Venous Distention Increased] : there was no jugular-venous distention [Thyroid Diffuse Enlargement] : the thyroid was not enlarged [Thyroid Nodule] : there were no palpable thyroid nodules [] : no respiratory distress [Auscultation Breath Sounds / Voice Sounds] : lungs were clear to auscultation bilaterally [Heart Rate And Rhythm] : heart rate was normal and rhythm regular [Heart Sounds] : normal S1 and S2 [Heart Sounds Gallop] : no gallops [Murmurs] : no murmurs [Heart Sounds Pericardial Friction Rub] : no pericardial rub [Examination Of The Chest] : the chest was normal in appearance [Chest Visual Inspection Thoracic Asymmetry] : no chest asymmetry [Diminished Respiratory Excursion] : normal chest expansion [Cervical Lymph Nodes Enlarged Posterior Bilaterally] : posterior cervical [Cervical Lymph Nodes Enlarged Anterior Bilaterally] : anterior cervical [Supraclavicular Lymph Nodes Enlarged Bilaterally] : supraclavicular [Axillary Lymph Nodes Enlarged Bilaterally] : axillary [Femoral Lymph Nodes Enlarged Bilaterally] : femoral [Inguinal Lymph Nodes Enlarged Bilaterally] : inguinal [Deep Tendon Reflexes (DTR)] : deep tendon reflexes were 2+ and symmetric [Sensation] : the sensory exam was normal to light touch and pinprick [No Focal Deficits] : no focal deficits [Oriented To Time, Place, And Person] : oriented to person, place, and time [Impaired Insight] : insight and judgment were intact [Affect] : the affect was normal

## 2024-06-11 NOTE — DATA REVIEWED
[FreeTextEntry1] : CT chest on 06/09/2023: -Stable right upper lobe ground-glass nodule. Only 1 is seen on today's study. This can be reassessed in 1 year. -Mild dilatation of the ascending aorta and atherosclerotic disease.  CT chest on 04/06/2022:  -Sub centimeter ground-glass nodules measuring up to 0.4 cm.  -Minimal bronchiectasis in the left lung. -Atherosclerosis in the thoracic aorta and coronary arteries

## 2024-06-11 NOTE — HISTORY OF PRESENT ILLNESS
[FreeTextEntry1] : ANNIE RODRÍGUEZ is a 74-year-old F, nonsmoker, with PMHx of type 2 DM, HLD, HTN, CKD, bronchitis, who is referred by Dr. Genny Riley for an initial evaluation of newly developed numerous bilateral lower lobe ground-glass nodules, the dominant is a 1.5 x1.0 cm nodule in the left lower lobe.   Patient reports hx of chronic coughs with small pulmonary nodules found during annually physical exam. She developed chills, nausea, and coughs during a trip in [place] this May and was treated for Pneumonia in a local hospital. [place]24. She reports resolution of above symptoms. Denies unintentional weight loss, headache, anorexia, fatigue, persist cough, chest pain, dyspnea, or hemoptysis. Andrzej personal history of lung TB or Covid infection.  is a heavy smoker.    CT chest on 05/30/2024:  -Numerous new bilateral lower lobe ground-glass nodules, measuring up to 1.5x1.0cm (in the left lower lobe. Possibly infectious/inflammatory. -Greater than 2-year stability of other nodules/focal findings, including a 0.9cm dominant focal scar versus spiculated nodule in the left lower lobe. 0.3cm perifissural right upper lobe GGN.  -Coronary artery calcifications

## 2024-06-11 NOTE — ASSESSMENT
[FreeTextEntry1] : ANNIE RODRÍGUEZ is a 74-year-old F, nonsmoker, with PMHx of type 2 DM, HLD, HTN, CKD, Bronchitis, who is referred by Dr. Genny Riley for an initial evaluation of newly developed numerous bilateral lower lobe ground-glass nodules.   Patient reports hx of chronic coughs with small pulmonary nodules found during annually physical exam. She developed chills, nausea, and coughs during a trip in [place] this May and was treated for Pneumonia in a local hospital. She reports resolution of above symptoms. Denies unintentional weight loss, headache, anorexia, fatigue, persist cough, chest pain, dyspnea, or hemoptysis. Andrzej personal history of lung TB or Covid infection.  is a heavy smoker.   I have independently reviewed the medical records and imaging at the time of this office consultation, and discussed the following interpretations with the patient:  - CT chest completed on 05/30/2024 showed numerous bilateral lower lobe ground-glass nodules, measuring up to 1.5x1.0cm in the left lower lobe.  These are new since Jun 2023. However, given her recent history of pneumonia, I suspect infectious/inflammatory changes.  I suggested her to follow up in 3 months with a non- contrast CT chest for stability.  Patient was advised to contact us should any concerning symptoms arise. Patient verbally understood and agreed with the plan.   Plan: - CT chest in three months  I, Dr. Chris Sheth MD, personally performed the evaluation and management (E/M) services for this new patient. That E/M includes conducting the clinically appropriate initial history &/or exam, assessing all conditions, and establishing the plan of care. Today, my NP, Kaitlynn York, was here to observe &/or participate in the visit & follow plan of care established by me.

## 2024-06-11 NOTE — CONSULT LETTER
[Dear  ___] : Dear  [unfilled], [Consult Letter:] : I had the pleasure of evaluating your patient, [unfilled]. [Please see my note below.] : Please see my note below. [Consult Closing:] : Thank you very much for allowing me to participate in the care of this patient.  If you have any questions, please do not hesitate to contact me. [Sincerely,] : Sincerely, [FreeTextEntry3] : Chris Sheth MD Professor, Cardiovascular & Thoracic Surgery [facility] [facility] [employer] Director of the [facility]  Director of Thoracic Surgery, [address] [place] [address] Phone: [phone] Fax: [phone]

## 2024-09-05 ENCOUNTER — NON-APPOINTMENT (OUTPATIENT)
Age: 74
End: 2024-09-05

## 2024-09-05 NOTE — HISTORY OF PRESENT ILLNESS
[FreeTextEntry1] : ANNIE RODRÍGUEZ is a 74-year-old F, nonsmoker, with PMHx of type 2 DM, HLD, HTN, CKD, Bronchitis, who is referred by Dr. Genny Riley for evaluation of newly developed numerous bilateral lower lobe ground-glass nodules in May 2024.   CT chest completed on 05/30/2024 showed numerous bilateral lower lobe ground-glass nodules, measuring up to 1.5x1.0cm in the left lower lobe. These are new since Jun 2023. Patient was treated for pneumonia in [place] prior to the image.  She was suggested to follow up in 3 months for stability.    She had a CT chest on 08/28/2024 and presents today for review. Reports doing well without fever, chills, weight loss, cough, or SOB.   CT chest on 08/28/2024 - New mild consolidation in the right middle lobe which could represent pneumonia in the appropriate clinical context.   - Previously seen ground-glass nodules in the lower lobes and left upper lobe have resolved. - Few tree-in-bud nodules in the left upper lobe, new likely representing infectious/inflammatory small airways disease. - Coronary calcifications.

## 2024-09-05 NOTE — ASSESSMENT
[FreeTextEntry1] : ANNIE RODRÍGUEZ is a 74-year-old F, nonsmoker, with PMHx of type 2 DM, HLD, HTN, CKD, Bronchitis, who is referred by Dr. Genny Riley for evaluation of newly developed numerous bilateral lower lobe ground-glass nodules in May 2024.   CT chest completed on 05/30/2024 showed numerous bilateral lower lobe ground-glass nodules, measuring up to 1.5x1.0cm in the left lower lobe. These are new since Jun 2023. Patient was treated for pneumonia in [place] prior to the image.    I reviewed the 3-months followup CT chest with her son today: previously seen ground-glass nodules in the lower lobes and left upper lobe have resolved, while there's newly developed mild consolidation in the right middle lobe which could represent pneumonia in the appropriate clinical context. Per her son, patient is asymptomatic.  I recommended continue follow up with a CT chest in 6 months.  Patient is suggested to call us if developing worse cough, fever, or SOB.   Plan: CT chest in 6 months

## 2024-09-06 ENCOUNTER — APPOINTMENT (OUTPATIENT)
Dept: THORACIC SURGERY | Facility: CLINIC | Age: 74
End: 2024-09-06
Payer: MEDICARE

## 2024-09-06 DIAGNOSIS — R91.1 SOLITARY PULMONARY NODULE: ICD-10-CM

## 2024-09-06 PROCEDURE — 99442: CPT

## 2024-09-11 NOTE — REASON FOR VISIT
[Home] : at home, [unfilled] , at the time of the visit. [Medical Office: ([facility])___] : at the medical office located in  [Verbal consent obtained from patient] : the patient, [unfilled] [FreeTextEntry1] : CT chest

## 2025-03-14 ENCOUNTER — NON-APPOINTMENT (OUTPATIENT)
Age: 75
End: 2025-03-14

## 2025-03-14 ENCOUNTER — APPOINTMENT (OUTPATIENT)
Dept: THORACIC SURGERY | Facility: CLINIC | Age: 75
End: 2025-03-14
Payer: MEDICARE

## 2025-03-14 DIAGNOSIS — R91.1 SOLITARY PULMONARY NODULE: ICD-10-CM

## 2025-03-14 PROCEDURE — 99212 OFFICE O/P EST SF 10 MIN: CPT | Mod: 93
